# Patient Record
Sex: FEMALE | Race: WHITE | Employment: OTHER | URBAN - METROPOLITAN AREA
[De-identification: names, ages, dates, MRNs, and addresses within clinical notes are randomized per-mention and may not be internally consistent; named-entity substitution may affect disease eponyms.]

---

## 2017-03-09 ENCOUNTER — HOSPITAL ENCOUNTER (OUTPATIENT)
Dept: RADIOLOGY | Facility: MEDICAL CENTER | Age: 72
End: 2017-03-09
Attending: INTERNAL MEDICINE
Payer: COMMERCIAL

## 2017-03-09 DIAGNOSIS — Z12.31 SCREENING MAMMOGRAM, ENCOUNTER FOR: ICD-10-CM

## 2017-03-09 PROCEDURE — 77063 BREAST TOMOSYNTHESIS BI: CPT

## 2017-03-11 ENCOUNTER — TELEPHONE (OUTPATIENT)
Dept: MEDICAL GROUP | Facility: PHYSICIAN GROUP | Age: 72
End: 2017-03-11

## 2017-03-13 ENCOUNTER — TELEPHONE (OUTPATIENT)
Dept: RADIOLOGY | Facility: MEDICAL CENTER | Age: 72
End: 2017-03-13

## 2017-03-22 ENCOUNTER — OFFICE VISIT (OUTPATIENT)
Dept: MEDICAL GROUP | Facility: PHYSICIAN GROUP | Age: 72
End: 2017-03-22
Payer: COMMERCIAL

## 2017-03-22 VITALS
WEIGHT: 236 LBS | TEMPERATURE: 97.6 F | BODY MASS INDEX: 40.29 KG/M2 | RESPIRATION RATE: 14 BRPM | OXYGEN SATURATION: 95 % | HEART RATE: 76 BPM | SYSTOLIC BLOOD PRESSURE: 122 MMHG | DIASTOLIC BLOOD PRESSURE: 82 MMHG | HEIGHT: 64 IN

## 2017-03-22 DIAGNOSIS — E78.2 MIXED HYPERLIPIDEMIA: ICD-10-CM

## 2017-03-22 DIAGNOSIS — R92.8 ABNORMAL MAMMOGRAM: ICD-10-CM

## 2017-03-22 DIAGNOSIS — E66.09 OBESITY DUE TO EXCESS CALORIES, UNSPECIFIED OBESITY SEVERITY: ICD-10-CM

## 2017-03-22 DIAGNOSIS — R73.01 IMPAIRED FASTING GLUCOSE: ICD-10-CM

## 2017-03-22 PROCEDURE — 99214 OFFICE O/P EST MOD 30 MIN: CPT | Performed by: NURSE PRACTITIONER

## 2017-03-22 NOTE — MR AVS SNAPSHOT
"        Rosa Riley   3/22/2017 2:40 PM   Office Visit   MRN: [id number]    Department:  [facility]   Dept Phone:  [phone]    Description:  Female : 1945   Provider:  RUDY Shelley           Reason for Visit     Annual Exam new to you / no complaints / request labs      Allergies as of 3/22/2017     No Known Allergies      You were diagnosed with     Obesity due to excess calories, unspecified obesity severity   [1953199]       Mixed hyperlipidemia   [272.2.ICD-9-CM]       Abnormal mammogram   [071580]       Impaired fasting glucose   [790.21.ICD-9-CM]         Vital Signs     Blood Pressure Pulse Temperature Respirations Height Weight    122/82 mmHg 76 36.4 °C (97.6 °F) 14 1.615 m (5' 3.58\") 107.049 kg (236 lb)    Body Mass Index Oxygen Saturation Smoking Status             41.04 kg/m2 95% Former Smoker         Basic Information     Date Of Birth Sex Race Ethnicity Preferred Language    1945 Female White Non- English      Your appointments     2017 10:40 AM   MA DX30 with [facility] MG 1   [facility] ([address])    [address] [place] [address] [address]  [facility] [id number] [phone]              Problem List              ICD-10-CM Priority Class Noted - Resolved    Obesity E66.9   2015 - Present    Mixed hyperlipidemia E78.2   2015 - Present    Impaired fasting glucose R73.01   2015 - Present    Left knee injury S89.92XA   3/7/2016 - Present    Abnormal mammogram R92.8   3/22/2017 - Present      Health Maintenance        Date Due Completion Dates    IMM DTaP/Tdap/Td Vaccine (1 - Tdap) 3/18/1966 ---    PAP SMEAR 3/18/1968 ---    IMM ZOSTER VACCINE 3/18/2007 ---    IMM PNEUMOCOCCAL 65+ (ADULT) LOW/MEDIUM RISK SERIES (1 of 2 - PCV13) 3/18/2012 ---    COLONOSCOPY 2016 (Prv Comp)    Override on 2006: Previously completed    IMM INFLUENZA (1) 2016 ---    MAMMOGRAM 3/9/2018 3/9/2017, 2015, 2014, 2013, " 6/20/2012, 4/25/2011, 4/8/2010, 4/8/2010, 3/23/2009, 3/23/2009, 3/17/2008, 3/17/2008, 2/10/2007            Current Immunizations     No immunizations on file.      Below and/or attached are the medications your provider expects you to take. Review all of your home medications and newly ordered medications with your provider and/or pharmacist. Follow medication instructions as directed by your provider and/or pharmacist. Please keep your medication list with you and share with your provider. Update the information when medications are discontinued, doses are changed, or new medications (including over-the-counter products) are added; and carry medication information at all times in the event of emergency situations     Allergies:  No Known Allergies          Medications  Valid as of: March 22, 2017 -  5:03 PM    Generic Name Brand Name Tablet Size Instructions for use    .                 Medicines prescribed today were sent to:     [facility] [id number] [facility] [address] [address]    Phone: [phone] Fax: [phone]    Open 24 Hours?: No      Medication refill instructions:       If your prescription bottle indicates you have medication refills left, it is not necessary to call your provider’s office. Please contact your pharmacy and they will refill your medication.    If your prescription bottle indicates you do not have any refills left, you may request refills at any time through one of the following ways: The online [employer] system (except Urgent Care), by calling your provider’s office, or by asking your pharmacy to contact your provider’s office with a refill request. Medication refills are processed only during regular business hours and may not be available until the next business day. Your provider may request additional information or to have a follow-up visit with you prior to refilling your medication.   *Please Note: Medication refills are  assigned a new Rx number when refilled electronically. Your pharmacy may indicate that no refills were authorized even though a new prescription for the same medication is available at the pharmacy. Please request the medicine by name with the pharmacy before contacting your provider for a refill.        Your To Do List     Future Labs/Procedures Complete By Expires    COMP METABOLIC PANEL  As directed 3/22/2018    VITAMIN D,25 HYDROXY  As directed 3/22/2018         MyChart Access Code: Activation code not generated  Current QuesCom Status: Active

## 2017-03-22 NOTE — ASSESSMENT & PLAN NOTE
Patient has elevated lipids in the past.  Patient does monitor her cholesterol once a year. Multivitamin. Vitamin E. Calcium TUMS. No fish oil.

## 2017-03-22 NOTE — ASSESSMENT & PLAN NOTE
This is a 72 year old female who has elevated BMI.  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  Diet: Standard Diet - Maintains - tries to stick with WW.

## 2017-03-23 NOTE — PROGRESS NOTES
Chief Complaint   Patient presents with   • Annual Exam     new to you / no complaints / request labs         HISTORY OF THE PRESENT ILLNESS: Patient is a 72 y.o. female. This pleasant patient is here today establish care. Her PCP retired and needs to establish.    Patient does not obtain preventative vaccines which include influenza and pneumonia. History of normal colonoscopy and is up-to-date. No longer obtaining Pap smears. Does get screening mammogram and labs.        Obesity  This is a 72 year old female who has elevated BMI.  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  Diet: Standard Diet - Maintains - tries to stick with WW.     Mixed hyperlipidemia  Patient has elevated lipids in the past.  Patient does monitor her cholesterol once a year. Multivitamin. Vitamin E. Calcium TUMS. No fish oil.     Impaired fasting glucose  Patient has history of elevated glucose.       Allergies: Review of patient's allergies indicates no known allergies.    No current [facility]-ordered outpatient prescriptions on file.     No current [facility]-ordered facility-administered medications on file.       Past Medical History   Diagnosis Date   • Hyperlipidemia        Past Surgical History   Procedure Laterality Date   • Tonsillectomy     • Foot orif     • Dilation and curettage         Social History   Substance Use Topics   • Smoking status: Former Smoker     Quit date: 01/22/1995   • Smokeless tobacco: Never Used   • Alcohol Use: 0.0 oz/week     0 Standard drinks or equivalent per week      Comment: social       No family status information on file.   History reviewed. No pertinent family history.    Review of Systems   Constitutional: Negative for fever, chills, weight loss and malaise/fatigue.   HENT: Negative for ear pain, nosebleeds, congestion, sore throat and neck pain.    Eyes: Negative for blurred vision.   Respiratory: Negative for cough, sputum production, shortness of breath and wheezing.    Cardiovascular:  "Negative for chest pain, palpitations, orthopnea and leg swelling.   Gastrointestinal: Negative for heartburn, nausea, vomiting and abdominal pain.   Genitourinary: Negative for dysuria, urgency and frequency.   Musculoskeletal: Negative for myalgias, back pain and joint pain.   Skin: Negative for rash and itching.   Neurological: Negative for dizziness, tingling, tremors, sensory change, focal weakness and headaches.   Endo/Heme/Allergies: Does not bruise/bleed easily.   Psychiatric/Behavioral: Negative for depression, anxiety, or memory loss.           Exam: Blood pressure 122/82, pulse 76, temperature 36.4 °C (97.6 °F), resp. rate 14, height 1.615 m (5' 3.58\"), weight 107.049 kg (236 lb), SpO2 95 %.  GENERALl: WDWN Alert, no distress  SKIN:  Warm, dry, good turgor. No rashes are present and visible area.  EYE: Equal round and reactive, conjunctiva clear lids normal.  ENMT: Lips without lesions good dentition oropharynx clear.  NECK: Trachea midline, no masses. No thyromegaly  RESPIRATORY: Unlabored respiratory efforts lungs clear to auscultation no wheezes no rhonchi.  CARDIOVASCULAR: Normal S1-S2 no murmur no edema  ABDOMEN: Soft, nontender no masses no hepatomegaly.  PSYCH: Alert and oriented x3 normal affect and mood   Musculoskeletal: No swollen joints, upper and lower extremities strength and tone normal, reflexes at biceps and patella 2+, gait is unlabored    Please note that this dictation was created using voice recognition software. I have made every reasonable attempt to correct obvious errors, but I expect that there are errors of grammar and possibly content that I did not discover before finalizing the note.      Assessment/Plan  1. Obesity due to excess calories, unspecified obesity severity  COMP METABOLIC PANEL    LIPID PANEL    VITAMIN D,25 HYDROXY   2. Mixed hyperlipidemia - Current status of condition is chronic and controlled on therapy.  And will monitor COMP METABOLIC PANEL    LIPID PANEL    " VITAMIN D,25 HYDROXY   3. Abnormal mammogram - has follow up COMP METABOLIC PANEL    LIPID PANEL    VITAMIN D,25 HYDROXY   4. Impaired fasting glucose - will monitor COMP METABOLIC PANEL    LIPID PANEL    VITAMIN D,25 HYDROXY

## 2017-04-03 ENCOUNTER — HOSPITAL ENCOUNTER (OUTPATIENT)
Dept: RADIOLOGY | Facility: MEDICAL CENTER | Age: 72
End: 2017-04-03
Attending: NURSE PRACTITIONER
Payer: COMMERCIAL

## 2017-04-03 DIAGNOSIS — R92.8 ABNORMAL MAMMOGRAM OF LEFT BREAST: ICD-10-CM

## 2017-04-03 PROCEDURE — G0279 TOMOSYNTHESIS, MAMMO: HCPCS | Mod: LT

## 2017-04-03 PROCEDURE — 76642 ULTRASOUND BREAST LIMITED: CPT | Mod: LT

## 2017-10-13 ENCOUNTER — HOSPITAL ENCOUNTER (OUTPATIENT)
Dept: LAB | Facility: MEDICAL CENTER | Age: 72
End: 2017-10-13
Attending: NURSE PRACTITIONER
Payer: MEDICARE

## 2017-10-13 DIAGNOSIS — R92.8 ABNORMAL MAMMOGRAM: ICD-10-CM

## 2017-10-13 DIAGNOSIS — E78.2 MIXED HYPERLIPIDEMIA: ICD-10-CM

## 2017-10-13 DIAGNOSIS — R73.01 IMPAIRED FASTING GLUCOSE: ICD-10-CM

## 2017-10-13 DIAGNOSIS — E66.09 OBESITY DUE TO EXCESS CALORIES, UNSPECIFIED OBESITY SEVERITY: ICD-10-CM

## 2017-10-13 LAB
25(OH)D3 SERPL-MCNC: 41 NG/ML (ref 30–100)
ALBUMIN SERPL BCP-MCNC: 4.2 G/DL (ref 3.2–4.9)
ALBUMIN/GLOB SERPL: 1.6 G/DL
ALP SERPL-CCNC: 74 U/L (ref 30–99)
ALT SERPL-CCNC: 14 U/L (ref 2–50)
ANION GAP SERPL CALC-SCNC: 7 MMOL/L (ref 0–11.9)
AST SERPL-CCNC: 19 U/L (ref 12–45)
BILIRUB SERPL-MCNC: 0.9 MG/DL (ref 0.1–1.5)
BUN SERPL-MCNC: 15 MG/DL (ref 8–22)
CALCIUM SERPL-MCNC: 9.3 MG/DL (ref 8.5–10.5)
CHLORIDE SERPL-SCNC: 105 MMOL/L (ref 96–112)
CHOLEST SERPL-MCNC: 251 MG/DL (ref 100–199)
CO2 SERPL-SCNC: 26 MMOL/L (ref 20–33)
CREAT SERPL-MCNC: 0.68 MG/DL (ref 0.5–1.4)
GFR SERPL CREATININE-BSD FRML MDRD: >60 ML/MIN/1.73 M 2
GLOBULIN SER CALC-MCNC: 2.7 G/DL (ref 1.9–3.5)
GLUCOSE SERPL-MCNC: 109 MG/DL (ref 65–99)
HDLC SERPL-MCNC: 69 MG/DL
LDLC SERPL CALC-MCNC: 157 MG/DL
POTASSIUM SERPL-SCNC: 4.4 MMOL/L (ref 3.6–5.5)
PROT SERPL-MCNC: 6.9 G/DL (ref 6–8.2)
SODIUM SERPL-SCNC: 138 MMOL/L (ref 135–145)
TRIGL SERPL-MCNC: 124 MG/DL (ref 0–149)

## 2017-10-13 PROCEDURE — 36415 COLL VENOUS BLD VENIPUNCTURE: CPT

## 2017-10-13 PROCEDURE — 82306 VITAMIN D 25 HYDROXY: CPT | Mod: GA

## 2017-10-13 PROCEDURE — 80061 LIPID PANEL: CPT

## 2017-10-13 PROCEDURE — 80053 COMPREHEN METABOLIC PANEL: CPT

## 2017-10-20 ENCOUNTER — OFFICE VISIT (OUTPATIENT)
Dept: MEDICAL GROUP | Facility: PHYSICIAN GROUP | Age: 72
End: 2017-10-20
Payer: MEDICARE

## 2017-10-20 VITALS
HEIGHT: 64 IN | OXYGEN SATURATION: 97 % | RESPIRATION RATE: 16 BRPM | BODY MASS INDEX: 40.29 KG/M2 | TEMPERATURE: 97.8 F | WEIGHT: 236 LBS | SYSTOLIC BLOOD PRESSURE: 132 MMHG | HEART RATE: 72 BPM | DIASTOLIC BLOOD PRESSURE: 74 MMHG

## 2017-10-20 DIAGNOSIS — E78.2 MIXED HYPERLIPIDEMIA: ICD-10-CM

## 2017-10-20 DIAGNOSIS — R73.01 IMPAIRED FASTING GLUCOSE: ICD-10-CM

## 2017-10-20 DIAGNOSIS — R92.8 ABNORMAL MAMMOGRAM: ICD-10-CM

## 2017-10-20 PROCEDURE — 99214 OFFICE O/P EST MOD 30 MIN: CPT | Performed by: NURSE PRACTITIONER

## 2017-10-20 ASSESSMENT — PATIENT HEALTH QUESTIONNAIRE - PHQ9: CLINICAL INTERPRETATION OF PHQ2 SCORE: 0

## 2017-10-20 NOTE — ASSESSMENT & PLAN NOTE
April 2017 - on yearly check.   Patient has ultrasound. ON YEARLY CHECK>    IMPRESSION:     No mammographic evidence of malignancy. Recommend annual follow-up mammography, monthly self breast exam and routine clinical evaluation.     These results were given to the patient at the time of visit.

## 2017-10-20 NOTE — ASSESSMENT & PLAN NOTE
Results for KATIE ZHANG (MRN [id number]) as of 10/19/2017 20:03   Ref. Range 10/13/2017 08:28   Cholesterol,Tot Latest Ref Range: 100 - 199 mg/dL 251 (H)   Triglycerides Latest Ref Range: 0 - 149 mg/dL 124   HDL Latest Ref Range: >=40 mg/dL 69   LDL Latest Ref Range: <100 mg/dL 157 (H)

## 2017-10-20 NOTE — PROGRESS NOTES
"Chief Complaint   Patient presents with   • Hyperlipidemia     fv labs       HISTORY OF PRESENT ILLNESS: Patient is a @ age 72 here  today to discuss:     Interval history:     Hospitalizations NONE    Injuries NONE     Illness  NONE         Mixed hyperlipidemia  Results for KATIE ZHANG (MRN [id number]) as of 10/19/2017 20:03   Ref. Range 10/13/2017 08:28   Cholesterol,Tot Latest Ref Range: 100 - 199 mg/dL 251 (H)   Triglycerides Latest Ref Range: 0 - 149 mg/dL 124   HDL Latest Ref Range: >=40 mg/dL 69   LDL Latest Ref Range: <100 mg/dL 157 (H)       Abnormal mammogram   April 2017 - on yearly check.   Patient has ultrasound. ON YEARLY CHECK>    IMPRESSION:     No mammographic evidence of malignancy. Recommend annual follow-up mammography, monthly self breast exam and routine clinical evaluation.     These results were given to the patient at the time of visit.       Impaired fasting glucose  Patient exercises 3-5 times a week.        Ref. Range 10/13/2017 08:28   Glucose Latest Ref Range: 65 - 99 mg/dL 109 (H)         Allergies:Review of patient's allergies indicates no known allergies.    No current [facility]-ordered outpatient prescriptions on file.     No current [facility]-ordered facility-administered medications on file.        Past Medical History:   Diagnosis Date   • Hyperlipidemia        Social History   Substance Use Topics   • Smoking status: Former Smoker     Quit date: 1/22/1995   • Smokeless tobacco: Never Used   • Alcohol use 0.0 oz/week      Comment: social       No family status information on file.   History reviewed. No pertinent family history.    ROS: As documented in my HPI      Exam:  Blood pressure 132/74, pulse 72, temperature 36.6 °C (97.8 °F), resp. rate 16, height 1.626 m (5' 4\"), weight 107 kg (236 lb), SpO2 97 %.  General:  Well nourished, well developed female in NAD  Head: Nontender scalp. No lesions  Neck: Supple. Symmetric Thyroid palpated. No bruits  Pulmonary:  Normal effort. No rales, " ronchi, or wheezing.  Cardiovascular: Regular rate and rhythm without murmur.   Abdomen: Soft nontender, normal bowel sounds  Extremities: no clubbing, cyanosis, or edema.  Psych: Alert and oriented x3.  Neurological: No focal deficits    Please note that this dictation was created using voice recognition software. I have made every reasonable attempt to correct obvious errors, but I expect that there are errors of grammar and possibly content that I did not discover before finalizing the note.    Assessment/Plan:  1. Mixed hyperlipidemia   Improving -with diet.    2. Abnormal mammogram   Yearly check.    3. Impaired fasting glucose    Improving with diet.           Does not get flu shot.

## 2017-10-20 NOTE — ASSESSMENT & PLAN NOTE
Patient exercises 3-5 times a week.        Ref. Range 10/13/2017 08:28   Glucose Latest Ref Range: 65 - 99 mg/dL 109 (H)

## 2018-06-21 ENCOUNTER — HOSPITAL ENCOUNTER (OUTPATIENT)
Dept: RADIOLOGY | Facility: MEDICAL CENTER | Age: 73
End: 2018-06-21
Attending: NURSE PRACTITIONER
Payer: MEDICARE

## 2018-06-21 DIAGNOSIS — Z12.31 SCREENING MAMMOGRAM, ENCOUNTER FOR: ICD-10-CM

## 2018-06-21 PROCEDURE — 77067 SCR MAMMO BI INCL CAD: CPT

## 2018-08-31 ENCOUNTER — TELEPHONE (OUTPATIENT)
Dept: MEDICAL GROUP | Facility: PHYSICIAN GROUP | Age: 73
End: 2018-08-31

## 2018-08-31 NOTE — TELEPHONE ENCOUNTER
FYI: Patient scheduled to see Dr. Jaquez on 8/30/18 [employer] message sent regarding labs forwarded to PCP. Patient states that if PCP orders labs she is requesting that PCP not order a Vitamin D lab as Insurance will not pay for it.

## 2018-09-04 DIAGNOSIS — R73.01 ELEVATED FASTING GLUCOSE: ICD-10-CM

## 2018-09-04 DIAGNOSIS — R79.89 ELEVATED TSH: ICD-10-CM

## 2018-09-04 DIAGNOSIS — E78.5 DYSLIPIDEMIA: ICD-10-CM

## 2018-09-04 DIAGNOSIS — R73.01 IMPAIRED FASTING GLUCOSE: ICD-10-CM

## 2018-10-24 ENCOUNTER — HOSPITAL ENCOUNTER (OUTPATIENT)
Dept: LAB | Facility: MEDICAL CENTER | Age: 73
End: 2018-10-24
Attending: FAMILY MEDICINE
Payer: MEDICARE

## 2018-10-24 DIAGNOSIS — R73.01 IMPAIRED FASTING GLUCOSE: ICD-10-CM

## 2018-10-24 DIAGNOSIS — R79.89 ELEVATED TSH: ICD-10-CM

## 2018-10-24 DIAGNOSIS — E78.5 DYSLIPIDEMIA: ICD-10-CM

## 2018-10-24 DIAGNOSIS — R73.01 ELEVATED FASTING GLUCOSE: ICD-10-CM

## 2018-10-24 LAB
ALBUMIN SERPL BCP-MCNC: 4.1 G/DL (ref 3.2–4.9)
ALBUMIN/GLOB SERPL: 1.5 G/DL
ALP SERPL-CCNC: 74 U/L (ref 30–99)
ALT SERPL-CCNC: 17 U/L (ref 2–50)
ANION GAP SERPL CALC-SCNC: 8 MMOL/L (ref 0–11.9)
AST SERPL-CCNC: 21 U/L (ref 12–45)
BILIRUB SERPL-MCNC: 0.9 MG/DL (ref 0.1–1.5)
BUN SERPL-MCNC: 11 MG/DL (ref 8–22)
CALCIUM SERPL-MCNC: 9.8 MG/DL (ref 8.5–10.5)
CHLORIDE SERPL-SCNC: 106 MMOL/L (ref 96–112)
CHOLEST SERPL-MCNC: 260 MG/DL (ref 100–199)
CO2 SERPL-SCNC: 28 MMOL/L (ref 20–33)
CREAT SERPL-MCNC: 0.76 MG/DL (ref 0.5–1.4)
EST. AVERAGE GLUCOSE BLD GHB EST-MCNC: 126 MG/DL
FASTING STATUS PATIENT QL REPORTED: NORMAL
GLOBULIN SER CALC-MCNC: 2.8 G/DL (ref 1.9–3.5)
GLUCOSE SERPL-MCNC: 104 MG/DL (ref 65–99)
HBA1C MFR BLD: 6 % (ref 0–5.6)
HDLC SERPL-MCNC: 70 MG/DL
LDLC SERPL CALC-MCNC: 170 MG/DL
POTASSIUM SERPL-SCNC: 4.1 MMOL/L (ref 3.6–5.5)
PROT SERPL-MCNC: 6.9 G/DL (ref 6–8.2)
SODIUM SERPL-SCNC: 142 MMOL/L (ref 135–145)
T4 FREE SERPL-MCNC: 0.84 NG/DL (ref 0.53–1.43)
TRIGL SERPL-MCNC: 99 MG/DL (ref 0–149)
TSH SERPL DL<=0.005 MIU/L-ACNC: 6.64 UIU/ML (ref 0.38–5.33)

## 2018-10-24 PROCEDURE — 36415 COLL VENOUS BLD VENIPUNCTURE: CPT | Mod: GA

## 2018-10-24 PROCEDURE — 84443 ASSAY THYROID STIM HORMONE: CPT

## 2018-10-24 PROCEDURE — 83036 HEMOGLOBIN GLYCOSYLATED A1C: CPT | Mod: GA

## 2018-10-24 PROCEDURE — 80053 COMPREHEN METABOLIC PANEL: CPT

## 2018-10-24 PROCEDURE — 80061 LIPID PANEL: CPT

## 2018-10-24 PROCEDURE — 84439 ASSAY OF FREE THYROXINE: CPT

## 2018-10-25 NOTE — PROGRESS NOTES
Rosa,  Your labs show high blood sugar and cholesterol: you can improve this with diet changes: more vegetables, less starches and sweets and fatty foods.   All other labs are normal!  Quintin Jaquez M.D.

## 2018-10-31 ENCOUNTER — OFFICE VISIT (OUTPATIENT)
Dept: MEDICAL GROUP | Facility: PHYSICIAN GROUP | Age: 73
End: 2018-10-31
Payer: MEDICARE

## 2018-10-31 VITALS
HEART RATE: 73 BPM | HEIGHT: 63 IN | BODY MASS INDEX: 36.96 KG/M2 | OXYGEN SATURATION: 98 % | TEMPERATURE: 98.3 F | RESPIRATION RATE: 16 BRPM | DIASTOLIC BLOOD PRESSURE: 74 MMHG | SYSTOLIC BLOOD PRESSURE: 128 MMHG | WEIGHT: 208.6 LBS

## 2018-10-31 DIAGNOSIS — Z12.11 COLON CANCER SCREENING: ICD-10-CM

## 2018-10-31 DIAGNOSIS — E66.9 OBESITY (BMI 35.0-39.9 WITHOUT COMORBIDITY): ICD-10-CM

## 2018-10-31 DIAGNOSIS — E78.2 MIXED HYPERLIPIDEMIA: ICD-10-CM

## 2018-10-31 DIAGNOSIS — E66.09 CLASS 2 OBESITY DUE TO EXCESS CALORIES WITHOUT SERIOUS COMORBIDITY WITH BODY MASS INDEX (BMI) OF 37.0 TO 37.9 IN ADULT: ICD-10-CM

## 2018-10-31 DIAGNOSIS — R73.01 IMPAIRED FASTING GLUCOSE: ICD-10-CM

## 2018-10-31 PROBLEM — R92.8 ABNORMAL MAMMOGRAM: Status: RESOLVED | Noted: 2017-03-22 | Resolved: 2018-10-31

## 2018-10-31 PROCEDURE — G0439 PPPS, SUBSEQ VISIT: HCPCS | Performed by: FAMILY MEDICINE

## 2018-10-31 ASSESSMENT — ACTIVITIES OF DAILY LIVING (ADL): BATHING_REQUIRES_ASSISTANCE: 0

## 2018-10-31 ASSESSMENT — ENCOUNTER SYMPTOMS: GENERAL WELL-BEING: EXCELLENT

## 2018-10-31 ASSESSMENT — PATIENT HEALTH QUESTIONNAIRE - PHQ9: CLINICAL INTERPRETATION OF PHQ2 SCORE: 0

## 2018-10-31 NOTE — PROGRESS NOTES
Chief Complaint   Patient presents with   • Annual Wellness Visit         HPI:  Rosa is a 73 y.o. here for Medicare Annual Wellness Visit    Patient declines all immunizations. Encouraged her to think about getting immunizations.     Patient Active Problem List    Diagnosis Date Noted   • Abnormal mammogram 03/22/2017   • Left knee injury 03/07/2016   • Obesity 04/06/2015   • Mixed hyperlipidemia 04/06/2015   • Impaired fasting glucose 04/06/2015       No current outpatient prescriptions on file.     No current facility-administered medications for this visit.         Patient is taking medications as noted in medication list.  Current supplements as per medication list.     Allergies: Patient has no known allergies.    Current social contact/activities: yard / movies / family / friends     Is patient current with immunizations? No, due for FLU, PNEUMOVAX (PPSV23), TDAP and SHINGRIX (Shingles). Patient is interested in receiving NONE today.    She  reports that she quit smoking about 23 years ago. She has never used smokeless tobacco. She reports that she drinks alcohol. She reports that she does not use drugs.  Counseling given: Not Answered        DPA/Advanced directive: Patient has Advanced Directive, Living Will and Durable Power of , but it is not on file. Instructed to bring in a copy to scan into their chart.    ROS:    Gait: Uses no assistive device   Ostomy: No   Other tubes: No   Amputations: No   Chronic oxygen use No   Last eye exam 2017   Wears hearing aids: No   : Denies any urinary leakage during the last 6 months      Screening:        Depression Screening    Little interest or pleasure in doing things?  0 - not at all  Feeling down, depressed, or hopeless? 0 - not at all  Patient Health Questionnaire Score: 0    If depressive symptoms identified deferred to follow up visit unless specifically addressed in assessment and plan.    Interpretation of PHQ-9 Total Score   Score Severity   1-4  No Depression   5-9 Mild Depression   10-14 Moderate Depression   15-19 Moderately Severe Depression   20-27 Severe Depression    Screening for Cognitive Impairment    Three Minute Recall (leader, season, table)  3/3    John clock face with all 12 numbers and set the hands to show 10 past 11.  Yes    If cognitive concerns identified, deferred for follow up unless specifically addressed in assessment and plan.    Fall Risk Assessment    Has the patient had two or more falls in the last year or any fall with injury in the last year?  No  If fall risk identified, deferred for follow up unless specifically addressed in assessment and plan.    Safety Assessment    Throw rugs on floor.  Yes  Handrails on all stairs.  No  Good lighting in all hallways.  Yes  Difficulty hearing.  No  Patient counseled about all safety risks that were identified.    Functional Assessment ADLs    Are there any barriers preventing you from cooking for yourself or meeting nutritional needs?  No.    Are there any barriers preventing you from driving safely or obtaining transportation?  No.    Are there any barriers preventing you from using a telephone or calling for help?  No.    Are there any barriers preventing you from shopping?  No.    Are there any barriers preventing you from taking care of your own finances?  No.    Are there any barriers preventing you from managing your medications?  No.    Are there any barriers preventing you from showering, bathing or dressing yourself?  No.    Are you currently engaging in any exercise or physical activity?  Yes.     What is your perception of your health?  Excellent.    Health Maintenance Summary                Annual Wellness Visit Overdue 1945     IMM DTaP/Tdap/Td Vaccine Overdue 3/18/1966     IMM ZOSTER VACCINES Overdue 3/18/1997     IMM PNEUMOCOCCAL 65+ (ADULT) LOW/MEDIUM RISK SERIES Overdue 3/18/2012     COLONOSCOPY Overdue 4/6/2016      Previously completed 4/6/2006     IMM INFLUENZA  Overdue 9/1/2018     MAMMOGRAM Next Due 6/21/2019      Done 6/21/2018 MA-MAMMO SCREENING BILAT W/TOMOSYNTHESIS W/CAD     Patient has more history with this topic...          Patient Care Team:  RUDY Shelley as PCP - General (Family Medicine)    Social History   Substance Use Topics   • Smoking status: Former Smoker     Quit date: 1/22/1995   • Smokeless tobacco: Never Used   • Alcohol use 0.0 oz/week      Comment: social     No family history on file.  She  has a past medical history of Hyperlipidemia.   Past Surgical History:   Procedure Laterality Date   • DILATION AND CURETTAGE     • FOOT ORIF     • TONSILLECTOMY             Exam:     There were no vitals taken for this visit. There is no height or weight on file to calculate BMI.    Hearing excellent.    Dentition good  Alert, oriented in no acute distress.  Eye contact is good, speech goal directed, affect calm      Assessment and Plan. The following treatment and monitoring plan is recommended:    Obesity  Very pleased to see patient has lost 30 lbs since last week.   She does follow weight watchers.   I encouraged her to continue with gradual sustainable weight loss.     Mixed hyperlipidemia  Slight increase in cholesterol since last visit.   She notes this is surprising as she follows a healthy diet.   HDL is protective.   She prefers not to start a statin.   Will continue to monitor labs and if very elevated will discuss statin again.     Impaired fasting glucose  Controlled with exercise and diet.   A1c recently is at 6.0  Will continue to monitor.       Services suggested: No services needed at this time  Health Care Screening recommendations as per orders if indicated.  Referrals offered: PT/OT/Nutrition counseling/Behavioral Health/Smoking cessation as per orders if indicated.    Discussion today about general wellness and lifestyle habits:    · Prevent falls and reduce trip hazards; Cautioned about securing or removing rugs.  · Have a working  fire alarm and carbon monoxide detector;   · Engage in regular physical activity and social activities       Follow-up: No Follow-up on file.

## 2018-11-13 ENCOUNTER — HOSPITAL ENCOUNTER (OUTPATIENT)
Facility: MEDICAL CENTER | Age: 73
End: 2018-11-13
Attending: FAMILY MEDICINE
Payer: MEDICARE

## 2018-11-13 PROCEDURE — 82274 ASSAY TEST FOR BLOOD FECAL: CPT

## 2018-11-15 DIAGNOSIS — Z12.11 COLON CANCER SCREENING: ICD-10-CM

## 2018-11-15 LAB — AMBIGUOUS DTTM AMBI4: NORMAL

## 2018-11-16 LAB — HEMOCCULT STL QL IA: NEGATIVE

## 2018-12-10 NOTE — ASSESSMENT & PLAN NOTE
Controlled with exercise and diet.   A1c recently is at 6.0  Will continue to monitor.   
Slight increase in cholesterol since last visit.   She notes this is surprising as she follows a healthy diet.   HDL is protective.   She prefers not to start a statin.   Will continue to monitor labs and if very elevated will discuss statin again.   
Very pleased to see patient has lost 30 lbs since last week.   She does follow weight watchers.   I encouraged her to continue with gradual sustainable weight loss.   
Home

## 2019-08-20 ENCOUNTER — HOSPITAL ENCOUNTER (OUTPATIENT)
Dept: RADIOLOGY | Facility: MEDICAL CENTER | Age: 74
End: 2019-08-20
Attending: FAMILY MEDICINE
Payer: MEDICARE

## 2019-08-20 DIAGNOSIS — Z12.31 VISIT FOR SCREENING MAMMOGRAM: ICD-10-CM

## 2019-08-20 PROCEDURE — 77063 BREAST TOMOSYNTHESIS BI: CPT

## 2019-08-22 NOTE — RESULT ENCOUNTER NOTE
Rosa  Your mammogram was normal! Next is due in one year.   Please let me know immediately if you notice any new lumps/rashes/pain/nipple discharge.  Quintin Jaquez M.D.

## 2019-10-24 DIAGNOSIS — E78.2 MIXED HYPERLIPIDEMIA: ICD-10-CM

## 2019-10-24 DIAGNOSIS — R79.89 ELEVATED TSH: ICD-10-CM

## 2019-10-24 DIAGNOSIS — R73.01 ELEVATED FASTING GLUCOSE: ICD-10-CM

## 2020-01-03 DIAGNOSIS — R35.0 URINARY FREQUENCY: ICD-10-CM

## 2020-01-06 ENCOUNTER — HOSPITAL ENCOUNTER (OUTPATIENT)
Dept: LAB | Facility: MEDICAL CENTER | Age: 75
End: 2020-01-06
Attending: FAMILY MEDICINE
Payer: MEDICARE

## 2020-01-06 DIAGNOSIS — R73.01 ELEVATED FASTING GLUCOSE: ICD-10-CM

## 2020-01-06 DIAGNOSIS — R79.89 ELEVATED TSH: ICD-10-CM

## 2020-01-06 DIAGNOSIS — E78.2 MIXED HYPERLIPIDEMIA: ICD-10-CM

## 2020-01-06 LAB
ALBUMIN SERPL BCP-MCNC: 4.2 G/DL (ref 3.2–4.9)
ALBUMIN/GLOB SERPL: 1.4 G/DL
ALP SERPL-CCNC: 74 U/L (ref 30–99)
ALT SERPL-CCNC: 21 U/L (ref 2–50)
ANION GAP SERPL CALC-SCNC: 9 MMOL/L (ref 0–11.9)
APPEARANCE UR: ABNORMAL
AST SERPL-CCNC: 22 U/L (ref 12–45)
BACTERIA #/AREA URNS HPF: ABNORMAL /HPF
BILIRUB SERPL-MCNC: 0.9 MG/DL (ref 0.1–1.5)
BILIRUB UR QL STRIP.AUTO: NEGATIVE
BUN SERPL-MCNC: 12 MG/DL (ref 8–22)
CALCIUM SERPL-MCNC: 9.2 MG/DL (ref 8.5–10.5)
CHLORIDE SERPL-SCNC: 106 MMOL/L (ref 96–112)
CHOLEST SERPL-MCNC: 282 MG/DL (ref 100–199)
CO2 SERPL-SCNC: 27 MMOL/L (ref 20–33)
COLOR UR: YELLOW
CREAT SERPL-MCNC: 0.78 MG/DL (ref 0.5–1.4)
EPI CELLS #/AREA URNS HPF: ABNORMAL /HPF
EST. AVERAGE GLUCOSE BLD GHB EST-MCNC: 111 MG/DL
FASTING STATUS PATIENT QL REPORTED: NORMAL
GLOBULIN SER CALC-MCNC: 2.9 G/DL (ref 1.9–3.5)
GLUCOSE SERPL-MCNC: 102 MG/DL (ref 65–99)
GLUCOSE UR STRIP.AUTO-MCNC: NEGATIVE MG/DL
HBA1C MFR BLD: 5.5 % (ref 0–5.6)
HDLC SERPL-MCNC: 88 MG/DL
KETONES UR STRIP.AUTO-MCNC: NEGATIVE MG/DL
LDLC SERPL CALC-MCNC: 176 MG/DL
LEUKOCYTE ESTERASE UR QL STRIP.AUTO: ABNORMAL
MICRO URNS: ABNORMAL
MUCOUS THREADS #/AREA URNS HPF: ABNORMAL /HPF
NITRITE UR QL STRIP.AUTO: NEGATIVE
PH UR STRIP.AUTO: 5.5 [PH] (ref 5–8)
POTASSIUM SERPL-SCNC: 4.3 MMOL/L (ref 3.6–5.5)
PROT SERPL-MCNC: 7.1 G/DL (ref 6–8.2)
PROT UR QL STRIP: NEGATIVE MG/DL
RBC # URNS HPF: ABNORMAL /HPF
RBC UR QL AUTO: ABNORMAL
SODIUM SERPL-SCNC: 142 MMOL/L (ref 135–145)
SP GR UR STRIP.AUTO: 1.02
TRIGL SERPL-MCNC: 92 MG/DL (ref 0–149)
UROBILINOGEN UR STRIP.AUTO-MCNC: 0.2 MG/DL
WBC #/AREA URNS HPF: ABNORMAL /HPF

## 2020-01-06 PROCEDURE — 81001 URINALYSIS AUTO W/SCOPE: CPT

## 2020-01-06 PROCEDURE — 83036 HEMOGLOBIN GLYCOSYLATED A1C: CPT | Mod: GA

## 2020-01-06 PROCEDURE — 80061 LIPID PANEL: CPT

## 2020-01-06 PROCEDURE — 84443 ASSAY THYROID STIM HORMONE: CPT

## 2020-01-06 PROCEDURE — 80053 COMPREHEN METABOLIC PANEL: CPT

## 2020-01-06 PROCEDURE — 36415 COLL VENOUS BLD VENIPUNCTURE: CPT

## 2020-01-07 LAB — TSH SERPL DL<=0.005 MIU/L-ACNC: 4.94 UIU/ML (ref 0.38–5.33)

## 2020-01-10 ENCOUNTER — OFFICE VISIT (OUTPATIENT)
Dept: MEDICAL GROUP | Facility: PHYSICIAN GROUP | Age: 75
End: 2020-01-10
Payer: MEDICARE

## 2020-01-10 ENCOUNTER — HOSPITAL ENCOUNTER (OUTPATIENT)
Facility: MEDICAL CENTER | Age: 75
End: 2020-01-10
Attending: FAMILY MEDICINE
Payer: MEDICARE

## 2020-01-10 VITALS
HEART RATE: 75 BPM | TEMPERATURE: 97.9 F | WEIGHT: 207 LBS | SYSTOLIC BLOOD PRESSURE: 142 MMHG | OXYGEN SATURATION: 97 % | RESPIRATION RATE: 14 BRPM | DIASTOLIC BLOOD PRESSURE: 60 MMHG | HEIGHT: 63 IN | BODY MASS INDEX: 36.68 KG/M2

## 2020-01-10 DIAGNOSIS — R31.9 HEMATURIA, UNSPECIFIED TYPE: ICD-10-CM

## 2020-01-10 DIAGNOSIS — R35.0 URINARY FREQUENCY: ICD-10-CM

## 2020-01-10 DIAGNOSIS — R73.01 ELEVATED FASTING GLUCOSE: ICD-10-CM

## 2020-01-10 DIAGNOSIS — E78.2 MIXED HYPERLIPIDEMIA: ICD-10-CM

## 2020-01-10 DIAGNOSIS — Z28.21 PNEUMOCOCCAL VACCINATION DECLINED: ICD-10-CM

## 2020-01-10 DIAGNOSIS — E66.9 OBESITY (BMI 35.0-39.9 WITHOUT COMORBIDITY): ICD-10-CM

## 2020-01-10 DIAGNOSIS — R73.01 IMPAIRED FASTING GLUCOSE: ICD-10-CM

## 2020-01-10 DIAGNOSIS — Z28.21 INFLUENZA VACCINATION DECLINED: ICD-10-CM

## 2020-01-10 PROCEDURE — 87086 URINE CULTURE/COLONY COUNT: CPT

## 2020-01-10 PROCEDURE — G0439 PPPS, SUBSEQ VISIT: HCPCS | Performed by: FAMILY MEDICINE

## 2020-01-10 RX ORDER — RETINOL, ERGOCALCIFEROL, .ALPHA.-TOCOPHEROL ACETATE, DL-, PHYTONADIONE, ASCORBIC ACID, NIACINAMIDE, RIBOFLAVIN 5-PHOSPHATE SODIUM, THIAMINE HYDROCHLORIDE, PYRIDOXINE HYDROCHLORIDE, DEXPANTHENOL, BIOTIN, FOLIC ACID, AND CYANOCOBALAMIN 200-150/10
KIT INTRAVENOUS
COMMUNITY
End: 2020-07-21

## 2020-01-10 RX ORDER — NITROFURANTOIN 25; 75 MG/1; MG/1
100 CAPSULE ORAL 2 TIMES DAILY
Qty: 10 CAP | Refills: 0 | Status: SHIPPED | OUTPATIENT
Start: 2020-01-10 | End: 2020-01-15

## 2020-01-10 ASSESSMENT — ENCOUNTER SYMPTOMS: GENERAL WELL-BEING: EXCELLENT

## 2020-01-10 ASSESSMENT — ACTIVITIES OF DAILY LIVING (ADL): BATHING_REQUIRES_ASSISTANCE: 0

## 2020-01-10 ASSESSMENT — PATIENT HEALTH QUESTIONNAIRE - PHQ9: CLINICAL INTERPRETATION OF PHQ2 SCORE: 0

## 2020-01-10 NOTE — ASSESSMENT & PLAN NOTE
A1c improved to 5.5  Encouraged to continue healthy diet changes low in sugars and starches, regular exercise and gradual weight loss.

## 2020-01-10 NOTE — PROGRESS NOTES
Chief Complaint   Patient presents with   • Annual Wellness Visit         HPI:  Rosa Riley is a 74 y.o. here for Medicare Annual Wellness Visit     Patient Active Problem List    Diagnosis Date Noted   • Influenza vaccination declined 01/10/2020   • Pneumococcal vaccination declined 01/10/2020   • Obesity (BMI 35.0-39.9 without comorbidity) ([facility]) 10/31/2018   • Obesity 04/06/2015   • Mixed hyperlipidemia 04/06/2015   • Impaired fasting glucose 04/06/2015       Current Outpatient Medications   Medication Sig Dispense Refill   • Cholecalciferol 4000 units Cap Take  by mouth.     • Ascorbic Acid 500 MG Cap Take  by mouth.     • CALCIUM PO Take  by mouth.     • Multiple Vitamin (M.V.I. ADULT) Injection by Intravenous route.     • nitrofurantoin monohyd macro (MACROBID) 100 MG Cap Take 1 Cap by mouth 2 times a day for 5 days. 10 Cap 0     No current facility-administered medications for this visit.             Current supplements as per medication list.       Allergies: Patient has no known allergies.    Current social contact/activities:      She  reports that she quit smoking about 24 years ago. She started smoking about 53 years ago. She smoked 1.00 pack per day for 0.00 years. She has never used smokeless tobacco. She reports current alcohol use. She reports that she does not use drugs.  Counseling given: Not Answered      DPA/Advanced Directive:  Patient has Advanced Directive on file.     ROS:    Gait: Uses no assistive device  Ostomy: No  Other tubes: No  Amputations: No  Chronic oxygen use: No  Last eye exam: May 2019  Wears hearing aids: No   : Reports urinary leakage during the last 6 months that has somewhat interfered with their daily activities or sleep.    Screening:    Depression Screening    Little interest or pleasure in doing things?  0 - not at all  Feeling down, depressed , or hopeless? 0 - not at all  Patient Health Questionnaire Score: 0     If depressive symptoms identified deferred to follow  up visit unless specifically addressed in assessment and plan.    Interpretation of PHQ-9 Total Score   Score Severity   1-4 No Depression   5-9 Mild Depression   10-14 Moderate Depression   15-19 Moderately Severe Depression   20-27 Severe Depression    Screening for Cognitive Impairment    Three Minute Recall (village, kitchen, baby) 3/3    John clock face with all 12 numbers and set the hands to show 10 past 10.  Yes    Cognitive concerns identified deferred for follow up unless specifically addressed in assessment and plan.    Fall Risk Assessment    Has the patient had two or more falls in the last year or any fall with injury in the last year?  No    Safety Assessment    Throw rugs on floor.  Yes  Handrails on all stairs.  No  Good lighting in all hallways.  Yes  Difficulty hearing.  Yes  Patient counseled about all safety risks that were identified.    Functional Assessment ADLs    Are there any barriers preventing you from cooking for yourself or meeting nutritional needs?  No.    Are there any barriers preventing you from driving safely or obtaining transportation?  No.    Are there any barriers preventing you from using a telephone or calling for help?  No.    Are there any barriers preventing you from shopping?  No.    Are there any barriers preventing you from taking care of your own finances?  No.    Are there any barriers preventing you from managing your medications?  No.    Are there any barriers preventing you from showering, bathing or dressing yourself?  No.    Are you currently engaging in any exercise or physical activity?  Yes.     What is your perception of your health?  Excellent.      Health Maintenance Summary                HEPATITIS C SCREENING Overdue 1945     IMM ZOSTER VACCINES Postponed 6/10/2020 Originally 3/18/1997. Patient Refused    IMM INFLUENZA Postponed 1/10/2021 Originally 9/1/2019. Patient Refused    IMM DTaP/Tdap/Td Vaccine Postponed 1/10/2021 Originally 3/18/1958.  "Patient Refused    IMM PNEUMOCOCCAL VACCINE: 65+ Years Postponed 1/10/2021 Originally 3/18/2012. Patient Refused    MAMMOGRAM Next Due 2020      Done 2019 MA-SCREENING MAMMO BILAT W/TOMOSYNTHESIS W/CAD     Patient has more history with this topic...    Annual Wellness Visit Next Due 1/10/2021      Done 1/10/2020 SUBSEQUENT ANNUAL WELLNESS VISIT-INCLUDES PPPS ()          Patient Care Team:  Quintin Jaquez M.D. as PCP - General (Family Medicine)        Social History     Tobacco Use   • Smoking status: Former Smoker     Packs/day: 1.00     Years: 0.00     Pack years: 0.00     Start date: 10/1/1966     Last attempt to quit: 1995     Years since quittin.1   • Smokeless tobacco: Never Used   Substance Use Topics   • Alcohol use: Yes     Alcohol/week: 0.0 oz     Comment: one glass of wine daily   • Drug use: No     Family History   Problem Relation Age of Onset   • Arthritis Maternal Grandmother    • Diabetes Maternal Grandmother      She  has a past medical history of Hyperlipidemia.   Past Surgical History:   Procedure Laterality Date   • DILATION AND CURETTAGE     • FOOT ORIF     • TONSILLECTOMY         Exam:   /60 (BP Location: Left arm, Patient Position: Sitting, BP Cuff Size: Large adult)   Pulse 75   Temp 36.6 °C (97.9 °F) (Temporal)   Resp 14   Ht 1.588 m (5' 2.5\")   Wt 93.9 kg (207 lb)   SpO2 97%  Body mass index is 37.26 kg/m².    Hearing excellent.    Dentition good  Alert, oriented in no acute distress.  Eye contact is good, speech goal directed, affect calm    Assessment and Plan. The following treatment and monitoring plan is recommended:    1. Obesity (BMI 35.0-39.9 without comorbidity) (HCC)  Patient identified as having weight management issue.  Appropriate orders and counseling given.    Subsequent Annual Wellness Visit - Includes PPPS ()   2. Mixed hyperlipidemia  Subsequent Annual Wellness Visit - Includes PPPS ()    Lipid Profile   3. Impaired fasting " glucose  Subsequent Annual Wellness Visit - Includes PPPS ()   4. Urinary frequency  URINE CULTURE(NEW)   5. Hematuria, unspecified type  URINE CULTURE(NEW)   6. Influenza vaccination declined     7. Pneumococcal vaccination declined     8. Elevated fasting glucose  HEMOGLOBIN A1C   Obesity (BMI 35.0-39.9 without comorbidity) ([facility])  Encouraged gradual weight loss with more plant based diet, portion control, regular exercise.     Mixed hyperlipidemia  Worsening on recent labs  Results for KATIE ZHANG (MRN [id number]) as of 1/10/2020 11:15   Ref. Range 1/6/2020 09:30   Cholesterol,Tot Latest Ref Range: 100 - 199 mg/dL 282 (H)   Triglycerides Latest Ref Range: 0 - 149 mg/dL 92   HDL Latest Ref Range: >=40 mg/dL 88   LDL Latest Ref Range: <100 mg/dL 176 (H)   Discussed ASCVD risk 14%  Advised addition of statin    Impaired fasting glucose  A1c improved to 5.5  Encouraged to continue healthy diet changes low in sugars and starches, regular exercise and gradual weight loss.     Influenza vaccination declined  Advised and education provided.       Pneumococcal vaccination declined  Advised and education provided.         Services suggested: No services needed at this time  Health Care Screening: Age-appropriate preventive services recommended by USPTF and ACIP covered by Medicare were discussed today. Services ordered if indicated and agreed upon by the patient.  Referrals offered: Community-based lifestyle interventions to reduce health risks and promote self-management and wellness, fall prevention, nutrition, physical activity, tobacco-use cessation, weight loss, and mental health services as per orders if indicated.    Discussion today about general wellness and lifestyle habits:    · Prevent falls and reduce trip hazards; Cautioned about securing or removing rugs.  · Have a working fire alarm and carbon monoxide detector;   · Engage in regular physical activity and social activities     Follow-up: No follow-ups on  file.

## 2020-01-10 NOTE — ASSESSMENT & PLAN NOTE
Worsening on recent labs  Results for KATIE ZHANG (MRN [id number]) as of 1/10/2020 11:15   Ref. Range 1/6/2020 09:30   Cholesterol,Tot Latest Ref Range: 100 - 199 mg/dL 282 (H)   Triglycerides Latest Ref Range: 0 - 149 mg/dL 92   HDL Latest Ref Range: >=40 mg/dL 88   LDL Latest Ref Range: <100 mg/dL 176 (H)   Discussed ASCVD risk 14%  Advised addition of statin

## 2020-01-12 LAB
BACTERIA UR CULT: NORMAL
SIGNIFICANT IND 70042: NORMAL
SITE SITE: NORMAL
SOURCE SOURCE: NORMAL

## 2020-07-14 ENCOUNTER — HOSPITAL ENCOUNTER (OUTPATIENT)
Dept: LAB | Facility: MEDICAL CENTER | Age: 75
End: 2020-07-14
Attending: FAMILY MEDICINE
Payer: MEDICARE

## 2020-07-14 DIAGNOSIS — E78.2 MIXED HYPERLIPIDEMIA: ICD-10-CM

## 2020-07-14 DIAGNOSIS — R73.01 ELEVATED FASTING GLUCOSE: ICD-10-CM

## 2020-07-14 LAB
CHOLEST SERPL-MCNC: 276 MG/DL (ref 100–199)
EST. AVERAGE GLUCOSE BLD GHB EST-MCNC: 111 MG/DL
FASTING STATUS PATIENT QL REPORTED: NORMAL
HBA1C MFR BLD: 5.5 % (ref 0–5.6)
HDLC SERPL-MCNC: 84 MG/DL
LDLC SERPL CALC-MCNC: 174 MG/DL
TRIGL SERPL-MCNC: 92 MG/DL (ref 0–149)

## 2020-07-14 PROCEDURE — 36415 COLL VENOUS BLD VENIPUNCTURE: CPT

## 2020-07-14 PROCEDURE — 83036 HEMOGLOBIN GLYCOSYLATED A1C: CPT | Mod: GA

## 2020-07-14 PROCEDURE — 80061 LIPID PANEL: CPT

## 2020-07-21 ENCOUNTER — OFFICE VISIT (OUTPATIENT)
Dept: MEDICAL GROUP | Facility: PHYSICIAN GROUP | Age: 75
End: 2020-07-21
Payer: MEDICARE

## 2020-07-21 VITALS
OXYGEN SATURATION: 95 % | RESPIRATION RATE: 12 BRPM | DIASTOLIC BLOOD PRESSURE: 114 MMHG | SYSTOLIC BLOOD PRESSURE: 200 MMHG | BODY MASS INDEX: 36.86 KG/M2 | HEART RATE: 78 BPM | WEIGHT: 208 LBS | HEIGHT: 63 IN | TEMPERATURE: 98.1 F

## 2020-07-21 DIAGNOSIS — R03.0 WHITE COAT SYNDROME WITHOUT DIAGNOSIS OF HYPERTENSION: ICD-10-CM

## 2020-07-21 DIAGNOSIS — Z53.20 STATIN DECLINED: ICD-10-CM

## 2020-07-21 DIAGNOSIS — Z12.31 ENCOUNTER FOR SCREENING MAMMOGRAM FOR BREAST CANCER: ICD-10-CM

## 2020-07-21 DIAGNOSIS — E66.9 OBESITY (BMI 35.0-39.9 WITHOUT COMORBIDITY): ICD-10-CM

## 2020-07-21 DIAGNOSIS — R53.83 FATIGUE, UNSPECIFIED TYPE: ICD-10-CM

## 2020-07-21 DIAGNOSIS — E78.2 MIXED HYPERLIPIDEMIA: ICD-10-CM

## 2020-07-21 PROBLEM — I10 ESSENTIAL HYPERTENSION: Status: ACTIVE | Noted: 2020-07-21

## 2020-07-21 PROCEDURE — 99214 OFFICE O/P EST MOD 30 MIN: CPT | Performed by: FAMILY MEDICINE

## 2020-07-21 RX ORDER — M-VIT,TX,IRON,MINS/CALC/FOLIC 27MG-0.4MG
1 TABLET ORAL DAILY
COMMUNITY

## 2020-07-21 NOTE — PROGRESS NOTES
"Subjective:   Katie Zhang is a 75 y.o. female here today for evaluation and management of:     White coat syndrome without diagnosis of hypertension  She runs 130/70s for her BPs at home. She notes MA cranks up the BP cuff too tight and is causes her pain and she gets angry.   She will send me a BP from home via [email or web address] today. In clinic BP today is 200/114    Mixed hyperlipidemia  Recent labs show slight dec in lipids.   Results for KATIE ZHANG (MRN [id number]) as of 7/21/2020 10:54   Ref. Range 1/6/2020 09:30 1/10/2020 11:30 7/14/2020 08:11   Cholesterol,Tot Latest Ref Range: 100 - 199 mg/dL 282 (H)  276 (H)   Triglycerides Latest Ref Range: 0 - 149 mg/dL 92  92   HDL Latest Ref Range: >=40 mg/dL 88  84   LDL Latest Ref Range: <100 mg/dL 176 (H)  174 (H)   No family hx of heart attacks or strokes.  Reviewed her ASCVD risk again of 14. She declines a statin.  Would like to continue exercise and weight loss and heart healthy diet.     Obesity (BMI 35.0-39.9 without comorbidity) (HCC)  No acute changes.   Encouraged to continue with walking, weight loss.   They lost their 14 year old dog who was her walking .          Current medicines (including changes today)  Current Outpatient Medications   Medication Sig Dispense Refill   • therapeutic multivitamin-minerals (THERAGRAN-M) Tab Take 1 Tab by mouth every day.     • Cholecalciferol 4000 units Cap Take  by mouth.     • Ascorbic Acid 500 MG Cap Take  by mouth.     • CALCIUM PO Take  by mouth.       No current facility-administered medications for this visit.      She  has a past medical history of Hyperlipidemia.    ROS  No chest pain, no shortness of breath, no abdominal pain       Objective:     BP (!) 200/114   Pulse 78   Temp 36.7 °C (98.1 °F) (Temporal)   Resp 12   Ht 1.6 m (5' 3\")   Wt 94.3 kg (208 lb)   SpO2 95%  Body mass index is 36.85 kg/m².   Physical Exam:  Constitutional: Alert, no distress.  Skin: Warm, dry, good turgor, no rashes in visible " areas.  Eye: Equal, round and reactive, conjunctiva clear, lids normal.  ENMT: Lips without lesions, good dentition, oropharynx clear.  Neck: Trachea midline, no masses, no thyromegaly. No cervical or supraclavicular lymphadenopathy  Respiratory: Unlabored respiratory effort  Psych: Alert and oriented x3, normal affect and mood.        Assessment and Plan:   The following treatment plan was discussed    1. Encounter for screening mammogram for breast cancer  - MA-SCREENING MAMMO BILAT W/CAD; Future    2. White coat syndrome without diagnosis of hypertension  She will continue to monitor BP at home and send me results today via [email or web address].   ER precautions discussed.     3. Mixed hyperlipidemia  Declines statin  - Comp Metabolic Panel; Future  - Lipid Profile; Future    4. Obesity (BMI 35.0-39.9 without comorbidity) (HCC)  Encouraged to restart walking.     5. Statin declined  Continue to monitor lipids.     6. Fatigue, unspecified type  - VITAMIN D,25 HYDROXY; Future  - TSH WITH REFLEX TO FT4; Future      Followup: Return in about 1 year (around 7/21/2021) for dyslipidemia, elevated BP.

## 2020-07-21 NOTE — RESULT ENCOUNTER NOTE
Rosa,  Your labs show cholesterol levels still high but with some improvement! A1c is excellent.   Quintin Jaquez M.D.

## 2020-07-21 NOTE — ASSESSMENT & PLAN NOTE
No acute changes.   Encouraged to continue with walking, weight loss.   They lost their 14 year old dog who was her walking .

## 2020-07-21 NOTE — ASSESSMENT & PLAN NOTE
Recent labs show slight dec in lipids.   Results for KATIE ZHANG (MRN [id number]) as of 7/21/2020 10:54   Ref. Range 1/6/2020 09:30 1/10/2020 11:30 7/14/2020 08:11   Cholesterol,Tot Latest Ref Range: 100 - 199 mg/dL 282 (H)  276 (H)   Triglycerides Latest Ref Range: 0 - 149 mg/dL 92  92   HDL Latest Ref Range: >=40 mg/dL 88  84   LDL Latest Ref Range: <100 mg/dL 176 (H)  174 (H)   No family hx of heart attacks or strokes.  Reviewed her ASCVD risk again of 14. She declines a statin.  Would like to continue exercise and weight loss and heart healthy diet.

## 2020-09-17 ENCOUNTER — HOSPITAL ENCOUNTER (OUTPATIENT)
Dept: RADIOLOGY | Facility: MEDICAL CENTER | Age: 75
End: 2020-09-17
Attending: FAMILY MEDICINE
Payer: MEDICARE

## 2020-09-17 DIAGNOSIS — Z12.31 VISIT FOR SCREENING MAMMOGRAM: ICD-10-CM

## 2020-09-17 DIAGNOSIS — R92.8 ABNORMAL MAMMOGRAM: ICD-10-CM

## 2020-09-17 PROCEDURE — 77067 SCR MAMMO BI INCL CAD: CPT

## 2020-09-17 NOTE — RESULT ENCOUNTER NOTE
Please advise patient that I reviewed screening mammogram results. There are some results that need further evaluation. I have ordered a diagnostic mammogram and ultrasound of right breast for a closer look.   Quintin Jaquez M.D.

## 2020-09-22 ENCOUNTER — HOSPITAL ENCOUNTER (OUTPATIENT)
Dept: RADIOLOGY | Facility: MEDICAL CENTER | Age: 75
End: 2020-09-22
Attending: FAMILY MEDICINE
Payer: MEDICARE

## 2020-09-22 DIAGNOSIS — R92.8 ABNORMAL MAMMOGRAM: ICD-10-CM

## 2020-09-22 PROCEDURE — G0279 TOMOSYNTHESIS, MAMMO: HCPCS | Mod: RT

## 2020-09-22 PROCEDURE — 76642 ULTRASOUND BREAST LIMITED: CPT | Mod: RT

## 2021-01-14 ENCOUNTER — TELEPHONE (OUTPATIENT)
Dept: MEDICAL GROUP | Facility: PHYSICIAN GROUP | Age: 76
End: 2021-01-14

## 2021-01-14 ENCOUNTER — PATIENT MESSAGE (OUTPATIENT)
Dept: MEDICAL GROUP | Facility: PHYSICIAN GROUP | Age: 76
End: 2021-01-14

## 2021-01-15 NOTE — PROGRESS NOTES
Please advise pt to call for March imaging appointment for 6 month follow up after last Breast US.   Thank you.   Quintin Jaquez M.D.

## 2021-03-22 ENCOUNTER — HOSPITAL ENCOUNTER (OUTPATIENT)
Dept: RADIOLOGY | Facility: MEDICAL CENTER | Age: 76
End: 2021-03-22
Attending: FAMILY MEDICINE
Payer: MEDICARE

## 2021-03-22 DIAGNOSIS — R92.8 ABNORMAL MAMMOGRAM: ICD-10-CM

## 2021-03-22 PROCEDURE — G0279 TOMOSYNTHESIS, MAMMO: HCPCS | Mod: RT

## 2021-03-22 PROCEDURE — 76642 ULTRASOUND BREAST LIMITED: CPT | Mod: RT

## 2021-03-23 DIAGNOSIS — R92.8 ABNORMAL FINDING ON BREAST IMAGING: ICD-10-CM

## 2021-03-23 NOTE — RESULT ENCOUNTER NOTE
Released to [facility]  Your breast ultrasound s showed that the area in the right breast is stable and likely benign (not cancer). Next is due in 6 months to keep an eye on it.  Please let me know immediately if you notice any new lumps/rashes/pain/nipple discharge.  Quintin Jaquez M.D.

## 2021-07-06 SDOH — HEALTH STABILITY: MENTAL HEALTH
STRESS IS WHEN SOMEONE FEELS TENSE, NERVOUS, ANXIOUS, OR CAN'T SLEEP AT NIGHT BECAUSE THEIR MIND IS TROUBLED. HOW STRESSED ARE YOU?: NOT AT ALL

## 2021-07-06 SDOH — ECONOMIC STABILITY: HOUSING INSECURITY: IN THE LAST 12 MONTHS, HOW MANY PLACES HAVE YOU LIVED?: 1

## 2021-07-06 SDOH — ECONOMIC STABILITY: HOUSING INSECURITY
IN THE LAST 12 MONTHS, WAS THERE A TIME WHEN YOU DID NOT HAVE A STEADY PLACE TO SLEEP OR SLEPT IN A SHELTER (INCLUDING NOW)?: NO

## 2021-07-06 SDOH — HEALTH STABILITY: PHYSICAL HEALTH: ON AVERAGE, HOW MANY DAYS PER WEEK DO YOU ENGAGE IN MODERATE TO STRENUOUS EXERCISE (LIKE A BRISK WALK)?: 7 DAYS

## 2021-07-06 SDOH — ECONOMIC STABILITY: TRANSPORTATION INSECURITY
IN THE PAST 12 MONTHS, HAS THE LACK OF TRANSPORTATION KEPT YOU FROM MEDICAL APPOINTMENTS OR FROM GETTING MEDICATIONS?: NO

## 2021-07-06 SDOH — ECONOMIC STABILITY: TRANSPORTATION INSECURITY
IN THE PAST 12 MONTHS, HAS LACK OF RELIABLE TRANSPORTATION KEPT YOU FROM MEDICAL APPOINTMENTS, MEETINGS, WORK OR FROM GETTING THINGS NEEDED FOR DAILY LIVING?: NO

## 2021-07-06 SDOH — ECONOMIC STABILITY: FOOD INSECURITY: WITHIN THE PAST 12 MONTHS, YOU WORRIED THAT YOUR FOOD WOULD RUN OUT BEFORE YOU GOT MONEY TO BUY MORE.: NEVER TRUE

## 2021-07-06 SDOH — ECONOMIC STABILITY: INCOME INSECURITY: IN THE LAST 12 MONTHS, WAS THERE A TIME WHEN YOU WERE NOT ABLE TO PAY THE MORTGAGE OR RENT ON TIME?: NO

## 2021-07-06 SDOH — HEALTH STABILITY: PHYSICAL HEALTH: ON AVERAGE, HOW MANY MINUTES DO YOU ENGAGE IN EXERCISE AT THIS LEVEL?: 30 MIN

## 2021-07-06 SDOH — ECONOMIC STABILITY: FOOD INSECURITY: WITHIN THE PAST 12 MONTHS, THE FOOD YOU BOUGHT JUST DIDN'T LAST AND YOU DIDN'T HAVE MONEY TO GET MORE.: NEVER TRUE

## 2021-07-06 SDOH — ECONOMIC STABILITY: TRANSPORTATION INSECURITY
IN THE PAST 12 MONTHS, HAS LACK OF TRANSPORTATION KEPT YOU FROM MEETINGS, WORK, OR FROM GETTING THINGS NEEDED FOR DAILY LIVING?: NO

## 2021-07-06 ASSESSMENT — SOCIAL DETERMINANTS OF HEALTH (SDOH)
IN A TYPICAL WEEK, HOW MANY TIMES DO YOU TALK ON THE PHONE WITH FAMILY, FRIENDS, OR NEIGHBORS?: MORE THAN THREE TIMES A WEEK
HOW OFTEN DO YOU GET TOGETHER WITH FRIENDS OR RELATIVES?: ONCE A WEEK
HOW OFTEN DO YOU ATTENT MEETINGS OF THE CLUB OR ORGANIZATION YOU BELONG TO?: PATIENT DECLINED
HOW OFTEN DO YOU ATTEND CHURCH OR RELIGIOUS SERVICES?: NEVER
HOW OFTEN DO YOU HAVE SIX OR MORE DRINKS ON ONE OCCASION: NEVER
HOW OFTEN DO YOU ATTEND CHURCH OR RELIGIOUS SERVICES?: NEVER
HOW OFTEN DO YOU ATTENT MEETINGS OF THE CLUB OR ORGANIZATION YOU BELONG TO?: PATIENT DECLINED
HOW OFTEN DO YOU GET TOGETHER WITH FRIENDS OR RELATIVES?: ONCE A WEEK
HOW OFTEN DO YOU HAVE A DRINK CONTAINING ALCOHOL: MONTHLY OR LESS
IN A TYPICAL WEEK, HOW MANY TIMES DO YOU TALK ON THE PHONE WITH FAMILY, FRIENDS, OR NEIGHBORS?: MORE THAN THREE TIMES A WEEK
WITHIN THE PAST 12 MONTHS, YOU WORRIED THAT YOUR FOOD WOULD RUN OUT BEFORE YOU GOT THE MONEY TO BUY MORE: NEVER TRUE
DO YOU BELONG TO ANY CLUBS OR ORGANIZATIONS SUCH AS CHURCH GROUPS UNIONS, FRATERNAL OR ATHLETIC GROUPS, OR SCHOOL GROUPS?: PATIENT DECLINED
HOW MANY DRINKS CONTAINING ALCOHOL DO YOU HAVE ON A TYPICAL DAY WHEN YOU ARE DRINKING: PATIENT DECLINED
DO YOU BELONG TO ANY CLUBS OR ORGANIZATIONS SUCH AS CHURCH GROUPS UNIONS, FRATERNAL OR ATHLETIC GROUPS, OR SCHOOL GROUPS?: PATIENT DECLINED

## 2021-07-06 ASSESSMENT — LIFESTYLE VARIABLES
HOW OFTEN DO YOU HAVE SIX OR MORE DRINKS ON ONE OCCASION: NEVER
HOW MANY STANDARD DRINKS CONTAINING ALCOHOL DO YOU HAVE ON A TYPICAL DAY: PATIENT DECLINED
HOW OFTEN DO YOU HAVE A DRINK CONTAINING ALCOHOL: MONTHLY OR LESS

## 2021-07-07 ENCOUNTER — HOSPITAL ENCOUNTER (OUTPATIENT)
Dept: LAB | Facility: MEDICAL CENTER | Age: 76
End: 2021-07-07
Attending: FAMILY MEDICINE
Payer: MEDICARE

## 2021-07-07 DIAGNOSIS — E78.2 MIXED HYPERLIPIDEMIA: ICD-10-CM

## 2021-07-07 DIAGNOSIS — R53.83 FATIGUE, UNSPECIFIED TYPE: ICD-10-CM

## 2021-07-07 PROCEDURE — 36415 COLL VENOUS BLD VENIPUNCTURE: CPT

## 2021-07-07 PROCEDURE — 80061 LIPID PANEL: CPT

## 2021-07-07 PROCEDURE — 80053 COMPREHEN METABOLIC PANEL: CPT

## 2021-07-07 PROCEDURE — 84443 ASSAY THYROID STIM HORMONE: CPT

## 2021-07-08 LAB
ALBUMIN SERPL BCP-MCNC: 4.2 G/DL (ref 3.2–4.9)
ALBUMIN/GLOB SERPL: 1.6 G/DL
ALP SERPL-CCNC: 83 U/L (ref 30–99)
ALT SERPL-CCNC: 16 U/L (ref 2–50)
ANION GAP SERPL CALC-SCNC: 11 MMOL/L (ref 7–16)
AST SERPL-CCNC: 28 U/L (ref 12–45)
BILIRUB SERPL-MCNC: 0.9 MG/DL (ref 0.1–1.5)
BUN SERPL-MCNC: 12 MG/DL (ref 8–22)
CALCIUM SERPL-MCNC: 9.1 MG/DL (ref 8.5–10.5)
CHLORIDE SERPL-SCNC: 107 MMOL/L (ref 96–112)
CHOLEST SERPL-MCNC: 264 MG/DL (ref 100–199)
CO2 SERPL-SCNC: 24 MMOL/L (ref 20–33)
CREAT SERPL-MCNC: 0.64 MG/DL (ref 0.5–1.4)
FASTING STATUS PATIENT QL REPORTED: NORMAL
GLOBULIN SER CALC-MCNC: 2.6 G/DL (ref 1.9–3.5)
GLUCOSE SERPL-MCNC: 103 MG/DL (ref 65–99)
HDLC SERPL-MCNC: 95 MG/DL
LDLC SERPL CALC-MCNC: 155 MG/DL
POTASSIUM SERPL-SCNC: 4.5 MMOL/L (ref 3.6–5.5)
PROT SERPL-MCNC: 6.8 G/DL (ref 6–8.2)
SODIUM SERPL-SCNC: 142 MMOL/L (ref 135–145)
TRIGL SERPL-MCNC: 71 MG/DL (ref 0–149)
TSH SERPL DL<=0.005 MIU/L-ACNC: 4.29 UIU/ML (ref 0.38–5.33)

## 2021-07-13 ENCOUNTER — OFFICE VISIT (OUTPATIENT)
Dept: MEDICAL GROUP | Facility: PHYSICIAN GROUP | Age: 76
End: 2021-07-13
Payer: MEDICARE

## 2021-07-13 VITALS
OXYGEN SATURATION: 96 % | WEIGHT: 210 LBS | RESPIRATION RATE: 20 BRPM | HEIGHT: 63 IN | DIASTOLIC BLOOD PRESSURE: 98 MMHG | BODY MASS INDEX: 37.21 KG/M2 | HEART RATE: 79 BPM | SYSTOLIC BLOOD PRESSURE: 142 MMHG | TEMPERATURE: 97.7 F

## 2021-07-13 DIAGNOSIS — I10 BENIGN ESSENTIAL HTN: ICD-10-CM

## 2021-07-13 DIAGNOSIS — E66.9 OBESITY (BMI 30-39.9): ICD-10-CM

## 2021-07-13 DIAGNOSIS — R73.01 IMPAIRED FASTING GLUCOSE: ICD-10-CM

## 2021-07-13 DIAGNOSIS — Z12.11 SCREENING FOR COLON CANCER: ICD-10-CM

## 2021-07-13 DIAGNOSIS — E78.2 MIXED HYPERLIPIDEMIA: ICD-10-CM

## 2021-07-13 DIAGNOSIS — E66.9 OBESITY (BMI 35.0-39.9 WITHOUT COMORBIDITY): ICD-10-CM

## 2021-07-13 DIAGNOSIS — Z28.21 PNEUMOCOCCAL VACCINATION DECLINED: ICD-10-CM

## 2021-07-13 PROBLEM — Z53.20 STATIN DECLINED: Status: RESOLVED | Noted: 2020-07-21 | Resolved: 2021-07-13

## 2021-07-13 PROCEDURE — G0439 PPPS, SUBSEQ VISIT: HCPCS | Performed by: FAMILY MEDICINE

## 2021-07-13 RX ORDER — LISINOPRIL 20 MG/1
20 TABLET ORAL DAILY
Qty: 90 TABLET | Refills: 3 | Status: SHIPPED
Start: 2021-07-13 | End: 2021-10-12

## 2021-07-13 RX ORDER — ATORVASTATIN CALCIUM 20 MG/1
20 TABLET, FILM COATED ORAL EVERY EVENING
Qty: 90 TABLET | Refills: 3 | Status: SHIPPED | OUTPATIENT
Start: 2021-07-13 | End: 2022-06-28 | Stop reason: SDUPTHER

## 2021-07-13 ASSESSMENT — PATIENT HEALTH QUESTIONNAIRE - PHQ9: CLINICAL INTERPRETATION OF PHQ2 SCORE: 0

## 2021-07-13 ASSESSMENT — ENCOUNTER SYMPTOMS: GENERAL WELL-BEING: EXCELLENT

## 2021-07-13 ASSESSMENT — ACTIVITIES OF DAILY LIVING (ADL): BATHING_REQUIRES_ASSISTANCE: 0

## 2021-07-13 NOTE — ASSESSMENT & PLAN NOTE
Encouraged patient to continue with portion sizes smaller, avoiding processed foods and continuing with regular exercise.

## 2021-07-13 NOTE — ASSESSMENT & PLAN NOTE
Patient continues to exercise regularly, walks a mile to mile and a half and I have been counseling her on benefits of a statin.   She is willing to start one.   Will start with lipitor which her  is on also.   ascvd risk is 17.8  She does not smoke.   Her blood pressure is elevated.   Results for KATIE ZHANG (MRN [id number]) as of 7/13/2021 11:07   Ref. Range 7/14/2020 08:11 7/7/2021 08:50   Cholesterol,Tot Latest Ref Range: 100 - 199 mg/dL 276 (H) 264 (H)   Triglycerides Latest Ref Range: 0 - 149 mg/dL 92 71   HDL Latest Ref Range: >=40 mg/dL 84 95   LDL Latest Ref Range: <100 mg/dL 174 (H) 155 (H)

## 2021-07-13 NOTE — PROGRESS NOTES
"Subjective:      Rosa Riley is a 76 y.o. female who presents with Annual Exam            HPI she notes her home BPs are elevated 140 to 150s    ROS she feels well, no CP, SOB or LE edema       Objective:     /98   Pulse 79   Temp 36.5 °C (97.7 °F) (Temporal)   Resp 20   Ht 1.6 m (5' 3\")   Wt 95.3 kg (210 lb)   SpO2 96%   BMI 37.20 kg/m²      Physical Exam  Physical Exam:  Constitutional: Alert, no distress, well-groomed.  Skin: No rashes in visible areas.  Eye: Round. Conjunctiva clear, lids normal. No icterus.   ENMT: Lips pink without lesions, good dentition, moist mucous membranes. Phonation normal.  Neck: No masses, no thyromegaly. Moves freely without pain.  Respiratory: Unlabored respiratory effort, no cough or audible wheeze  Psych: Alert and oriented x3, normal affect and mood.                   Assessment/Plan:        There are no diagnoses linked to this encounter.    Chief Complaint   Patient presents with   • Annual Exam       HPI:  Rosa Riley is a 76 y.o. here for Medicare Annual Wellness Visit     Patient Active Problem List    Diagnosis Date Noted   • White coat syndrome without diagnosis of hypertension 07/21/2020   • Statin declined 07/21/2020   • Influenza vaccination declined 01/10/2020   • Pneumococcal vaccination declined 01/10/2020   • Obesity (BMI 35.0-39.9 without comorbidity) ([facility]) 10/31/2018   • Obesity 04/06/2015   • Mixed hyperlipidemia 04/06/2015   • Impaired fasting glucose 04/06/2015       Current Outpatient Medications   Medication Sig Dispense Refill   • therapeutic multivitamin-minerals (THERAGRAN-M) Tab Take 1 Tab by mouth every day.     • Cholecalciferol 4000 units Cap Take  by mouth.     • Ascorbic Acid 500 MG Cap Take  by mouth.     • CALCIUM PO Take  by mouth.       No current facility-administered medications for this visit.          Current supplements as per medication list.     Allergies: Patient has no known allergies.    Current social contact/activities: "      She  reports that she quit smoking about 25 years ago. She started smoking about 54 years ago. She smoked 1.00 pack per day for 0.00 years. She has never used smokeless tobacco. She reports current alcohol use. She reports that she does not use drugs.  Counseling given: Not Answered      DPA/Advanced Directive:  Patient has Advanced Directive on file.     ROS:    Gait: Uses no assistive device  Ostomy: No  Other tubes: No  Amputations: No  Chronic oxygen use: No  Last eye exam: 05/2021  Wears hearing aids: No   : Denies any urinary leakage during the last 6 months    Screening:    Depression Screening    Little interest or pleasure in doing things?  0 - not at all  Feeling down, depressed , or hopeless? 0 - not at all  Patient Health Questionnaire Score: 0     If depressive symptoms identified deferred to follow up visit unless specifically addressed in assessment and plan.    Interpretation of PHQ-9 Total Score   Score Severity   1-4 No Depression   5-9 Mild Depression   10-14 Moderate Depression   15-19 Moderately Severe Depression   20-27 Severe Depression    Screening for Cognitive Impairment    Three Minute Recall (captain, garden, picture) 2/3    John clock face with all 12 numbers and set the hands to show 5 past 8.  Yes    Cognitive concerns identified deferred for follow up unless specifically addressed in assessment and plan.    Fall Risk Assessment    Has the patient had two or more falls in the last year or any fall with injury in the last year?  No    Safety Assessment    Throw rugs on floor.  Yes  Handrails on all stairs.  Yes  Good lighting in all hallways.  Yes  Difficulty hearing.  No  Patient counseled about all safety risks that were identified.    Functional Assessment ADLs    Are there any barriers preventing you from cooking for yourself or meeting nutritional needs?  No.    Are there any barriers preventing you from driving safely or obtaining transportation?  No.    Are there any  "barriers preventing you from using a telephone or calling for help?  No.    Are there any barriers preventing you from shopping?  No.    Are there any barriers preventing you from taking care of your own finances?  No.    Are there any barriers preventing you from managing your medications?  No.    Are there any barriers preventing you from showering, bathing or dressing yourself?  No.    Are you currently engaging in any exercise or physical activity?  Yes.     What is your perception of your health?  Excellent.      Health Maintenance Summary                IMM DTaP/Tdap/Td Vaccine Overdue 3/18/1966     IMM PNEUMOCOCCAL VACCINE: 65+ Years Overdue 3/18/2012     Annual Wellness Visit Overdue 1/10/2021      Done 1/10/2020 SUBSEQUENT ANNUAL WELLNESS VISIT-INCLUDES PPPS ()    IMM INFLUENZA Next Due 2021      Done 10/7/2020 Ext Imm: Fluzone High-Dose Quad    MAMMOGRAM Next Due 2021      Done 2020 MA-SCREENING MAMMO BILAT W/TOMOSYNTHESIS W/CAD     Patient has more history with this topic...          Patient Care Team:  Quintin Jaquez M.D. as PCP - General (Family Medicine)        Social History     Tobacco Use   • Smoking status: Former Smoker     Packs/day: 1.00     Years: 0.00     Pack years: 0.00     Start date: 10/1/1966     Quit date: 1995     Years since quittin.6   • Smokeless tobacco: Never Used   Vaping Use   • Vaping Use: Never used   Substance Use Topics   • Alcohol use: Yes     Alcohol/week: 0.0 oz     Comment: one glass of wine daily   • Drug use: No     Family History   Problem Relation Age of Onset   • Arthritis Maternal Grandmother    • Diabetes Maternal Grandmother      She  has a past medical history of Hyperlipidemia.   Past Surgical History:   Procedure Laterality Date   • DILATION AND CURETTAGE     • FOOT ORIF     • TONSILLECTOMY         Exam:   /98   Pulse 79   Temp 36.5 °C (97.7 °F) (Temporal)   Resp 20   Ht 1.6 m (5' 3\")   Wt 95.3 kg (210 lb)   SpO2 96%  " Body mass index is 37.2 kg/m².    Hearing good.    Dentition good  Alert, oriented in no acute distress.  Eye contact is good, speech goal directed, affect calm    Assessment and Plan. The following treatment and monitoring plan is recommended:    Mixed hyperlipidemia  Patient continues to exercise regularly, walks a mile to mile and a half and I have been counseling her on benefits of a statin.   She is willing to start one.   Will start with lipitor which her  is on also.   ascvd risk is 17.8  She does not smoke.   Her blood pressure is elevated.   Results for KATIE ZHANG (MRN [id number]) as of 7/13/2021 11:07   Ref. Range 7/14/2020 08:11 7/7/2021 08:50   Cholesterol,Tot Latest Ref Range: 100 - 199 mg/dL 276 (H) 264 (H)   Triglycerides Latest Ref Range: 0 - 149 mg/dL 92 71   HDL Latest Ref Range: >=40 mg/dL 84 95   LDL Latest Ref Range: <100 mg/dL 174 (H) 155 (H)       Benign essential HTN  Patient notes bp at home is 140s and 150s now.   Will start with lisinopril 20. Advised on potential side effects.       Obesity (BMI 35.0-39.9 without comorbidity) ([facility])  Encouraged patient to continue with portion sizes smaller, avoiding processed foods and continuing with regular exercise.     Pneumococcal vaccination declined  Patient declines pneumonia vaccination, advised on benefits and education provided. She did receive her first flu shot and also received her covid vaccinations.     Impaired fasting glucose  Encouraged diet changes, continue regular exercise.     Services suggested: No services needed at this time  Health Care Screening: Age-appropriate preventive services recommended by USPTF and ACIP covered by Medicare were discussed today. Services ordered if indicated and agreed upon by the patient.  Referrals offered: Community-based lifestyle interventions to reduce health risks and promote self-management and wellness, fall prevention, nutrition, physical activity, tobacco-use cessation, weight loss, and  mental health services as per orders if indicated.    Discussion today about general wellness and lifestyle habits:    · Prevent falls and reduce trip hazards; Cautioned about securing or removing rugs.  · Have a working fire alarm and carbon monoxide detector;   · Engage in regular physical activity and social activities     Follow-up: No follow-ups on file.

## 2021-07-13 NOTE — ASSESSMENT & PLAN NOTE
Patient declines pneumonia vaccination, advised on benefits and education provided. She did receive her first flu shot and also received her covid vaccinations.

## 2021-07-13 NOTE — ASSESSMENT & PLAN NOTE
Patient notes bp at home is 140s and 150s now.   Will start with lisinopril 20. Advised on potential side effects.

## 2021-10-12 ENCOUNTER — OFFICE VISIT (OUTPATIENT)
Dept: MEDICAL GROUP | Facility: PHYSICIAN GROUP | Age: 76
End: 2021-10-12
Payer: MEDICARE

## 2021-10-12 VITALS
TEMPERATURE: 97.1 F | BODY MASS INDEX: 36.46 KG/M2 | SYSTOLIC BLOOD PRESSURE: 122 MMHG | RESPIRATION RATE: 20 BRPM | HEIGHT: 63 IN | DIASTOLIC BLOOD PRESSURE: 78 MMHG | WEIGHT: 205.8 LBS | OXYGEN SATURATION: 97 % | HEART RATE: 80 BPM

## 2021-10-12 DIAGNOSIS — I10 BENIGN ESSENTIAL HTN: ICD-10-CM

## 2021-10-12 DIAGNOSIS — E78.2 MIXED HYPERLIPIDEMIA: ICD-10-CM

## 2021-10-12 PROCEDURE — 99214 OFFICE O/P EST MOD 30 MIN: CPT | Performed by: FAMILY MEDICINE

## 2021-10-12 RX ORDER — LISINOPRIL 30 MG/1
30 TABLET ORAL DAILY
Qty: 90 TABLET | Refills: 3 | Status: SHIPPED | OUTPATIENT
Start: 2021-10-12 | End: 2022-06-28 | Stop reason: SDUPTHER

## 2021-10-12 NOTE — ASSESSMENT & PLAN NOTE
Patient is tolerating atorvastatin 20 with no myopathy or myalgia.  LFTs in July were normal.  Will recheck labs in 6 months.  Results for KATIE ZHANG (MRN [id number]) as of 10/12/2021 10:03   Ref. Range 7/7/2021 08:50   Cholesterol,Tot Latest Ref Range: 100 - 199 mg/dL 264 (H)   Triglycerides Latest Ref Range: 0 - 149 mg/dL 71   HDL Latest Ref Range: >=40 mg/dL 95   LDL Latest Ref Range: <100 mg/dL 155 (H)

## 2021-10-12 NOTE — PROGRESS NOTES
"Subjective:   Katie Zhang is a 76 y.o. female here today for evaluation and management of:     Benign essential HTN  She has some increased blood pressures to 140s and 90s at home as evidenced by her blood pressure log which she brings in with her today.  She also has loose to 116/88 systolic will increase her lisinopril slightly to 30 mg from 20.  Also she can take her lisinopril in the morning instead of at night and see if this helps.  She has no cough with the lisinopril.  He has no chest pain, no lightheadedness no leg swelling.    Mixed hyperlipidemia  Patient is tolerating atorvastatin 20 with no myopathy or myalgia.  LFTs in July were normal.  Will recheck labs in 6 months.  Results for KATIE ZHANG (MRN [id number]) as of 10/12/2021 10:03   Ref. Range 7/7/2021 08:50   Cholesterol,Tot Latest Ref Range: 100 - 199 mg/dL 264 (H)   Triglycerides Latest Ref Range: 0 - 149 mg/dL 71   HDL Latest Ref Range: >=40 mg/dL 95   LDL Latest Ref Range: <100 mg/dL 155 (H)          Current medicines (including changes today)  Current Outpatient Medications   Medication Sig Dispense Refill   • lisinopril (PRINIVIL) 30 MG tablet Take 1 Tablet by mouth every day. 90 Tablet 3   • atorvastatin (LIPITOR) 20 MG Tab Take 1 tablet by mouth every evening. 90 tablet 3   • therapeutic multivitamin-minerals (THERAGRAN-M) Tab Take 1 Tab by mouth every day.     • Cholecalciferol 4000 units Cap Take  by mouth.     • Ascorbic Acid 500 MG Cap Take  by mouth.     • CALCIUM PO Take  by mouth.       No current facility-administered medications for this visit.     She  has a past medical history of Hyperlipidemia.    ROS  No chest pain, no shortness of breath, no abdominal pain       Objective:     /78 (BP Location: Left arm, Patient Position: Sitting, BP Cuff Size: Large adult)   Pulse 80   Temp 36.2 °C (97.1 °F) (Temporal)   Resp 20   Ht 1.6 m (5' 3\")   Wt 93.4 kg (205 lb 12.8 oz)   SpO2 97%  Body mass index is 36.46 kg/m².   Physical " Exam:  Constitutional: Alert, no distress.  Skin: Warm, dry, good turgor, no rashes in visible areas.  Eye: Equal, round and reactive, conjunctiva clear, lids normal.  ENMT: Lips without lesions, good dentition, oropharynx clear.  Neck: Trachea midline, no masses, no thyromegaly. No cervical or supraclavicular lymphadenopathy  Respiratory: Unlabored respiratory effort, lungs clear to auscultation, no wheezes, no ronchi.  Cardiovascular: Normal S1, S2, no murmur, no edema.  Abdomen: Soft, non-tender, no masses, no hepatosplenomegaly.  Psych: Alert and oriented x3, normal affect and mood.        Assessment and Plan:   The following treatment plan was discussed    1. Benign essential HTN  Home bps >140/90  Inc lisinopril to 30 mg  Repeat labs.     2. Mixed hyperlipidemia  Improving  Check lfts      Followup: Return in about 6 months (around 4/12/2022).

## 2021-10-12 NOTE — ASSESSMENT & PLAN NOTE
She has some increased blood pressures to 140s and 90s at home as evidenced by her blood pressure log which she brings in with her today.  She also has loose to 116/88 systolic will increase her lisinopril slightly to 30 mg from 20.  Also she can take her lisinopril in the morning instead of at night and see if this helps.  She has no cough with the lisinopril.  He has no chest pain, no lightheadedness no leg swelling.

## 2022-04-06 ENCOUNTER — HOSPITAL ENCOUNTER (OUTPATIENT)
Dept: LAB | Facility: MEDICAL CENTER | Age: 77
End: 2022-04-06
Attending: FAMILY MEDICINE
Payer: MEDICARE

## 2022-04-06 DIAGNOSIS — I10 BENIGN ESSENTIAL HTN: ICD-10-CM

## 2022-04-06 DIAGNOSIS — E78.2 MIXED HYPERLIPIDEMIA: ICD-10-CM

## 2022-04-06 LAB
ALBUMIN SERPL BCP-MCNC: 4.4 G/DL (ref 3.2–4.9)
ALBUMIN/GLOB SERPL: 1.6 G/DL
ALP SERPL-CCNC: 88 U/L (ref 30–99)
ALT SERPL-CCNC: 21 U/L (ref 2–50)
ANION GAP SERPL CALC-SCNC: 12 MMOL/L (ref 7–16)
AST SERPL-CCNC: 26 U/L (ref 12–45)
BILIRUB SERPL-MCNC: 1 MG/DL (ref 0.1–1.5)
BUN SERPL-MCNC: 13 MG/DL (ref 8–22)
CALCIUM SERPL-MCNC: 9.7 MG/DL (ref 8.5–10.5)
CHLORIDE SERPL-SCNC: 103 MMOL/L (ref 96–112)
CHOLEST SERPL-MCNC: 189 MG/DL (ref 100–199)
CO2 SERPL-SCNC: 24 MMOL/L (ref 20–33)
CREAT SERPL-MCNC: 0.67 MG/DL (ref 0.5–1.4)
FASTING STATUS PATIENT QL REPORTED: NORMAL
GFR SERPLBLD CREATININE-BSD FMLA CKD-EPI: 91 ML/MIN/1.73 M 2
GLOBULIN SER CALC-MCNC: 2.8 G/DL (ref 1.9–3.5)
GLUCOSE SERPL-MCNC: 102 MG/DL (ref 65–99)
HDLC SERPL-MCNC: 87 MG/DL
LDLC SERPL CALC-MCNC: 91 MG/DL
POTASSIUM SERPL-SCNC: 4.5 MMOL/L (ref 3.6–5.5)
PROT SERPL-MCNC: 7.2 G/DL (ref 6–8.2)
SODIUM SERPL-SCNC: 139 MMOL/L (ref 135–145)
TRIGL SERPL-MCNC: 54 MG/DL (ref 0–149)

## 2022-04-06 PROCEDURE — 80061 LIPID PANEL: CPT

## 2022-04-06 PROCEDURE — 36415 COLL VENOUS BLD VENIPUNCTURE: CPT

## 2022-04-06 PROCEDURE — 80053 COMPREHEN METABOLIC PANEL: CPT

## 2022-04-12 ENCOUNTER — OFFICE VISIT (OUTPATIENT)
Dept: MEDICAL GROUP | Facility: PHYSICIAN GROUP | Age: 77
End: 2022-04-12
Payer: MEDICARE

## 2022-04-12 VITALS
HEIGHT: 62 IN | SYSTOLIC BLOOD PRESSURE: 138 MMHG | TEMPERATURE: 97.9 F | DIASTOLIC BLOOD PRESSURE: 90 MMHG | OXYGEN SATURATION: 97 % | BODY MASS INDEX: 38.64 KG/M2 | WEIGHT: 210 LBS | HEART RATE: 80 BPM

## 2022-04-12 DIAGNOSIS — R73.01 IMPAIRED FASTING GLUCOSE: ICD-10-CM

## 2022-04-12 DIAGNOSIS — E78.2 MIXED HYPERLIPIDEMIA: ICD-10-CM

## 2022-04-12 DIAGNOSIS — Z23 NEED FOR VACCINATION: ICD-10-CM

## 2022-04-12 DIAGNOSIS — Z12.31 ENCOUNTER FOR SCREENING MAMMOGRAM FOR BREAST CANCER: ICD-10-CM

## 2022-04-12 PROCEDURE — 99214 OFFICE O/P EST MOD 30 MIN: CPT | Mod: 25 | Performed by: FAMILY MEDICINE

## 2022-04-12 PROCEDURE — G0009 ADMIN PNEUMOCOCCAL VACCINE: HCPCS | Performed by: FAMILY MEDICINE

## 2022-04-12 PROCEDURE — 90732 PPSV23 VACC 2 YRS+ SUBQ/IM: CPT | Performed by: FAMILY MEDICINE

## 2022-04-12 RX ORDER — CLOSTRIDIUM TETANI TOXOID ANTIGEN (FORMALDEHYDE INACTIVATED), CORYNEBACTERIUM DIPHTHERIAE TOXOID ANTIGEN (FORMALDEHYDE INACTIVATED), BORDETELLA PERTUSSIS TOXOID ANTIGEN (GLUTARALDEHYDE INACTIVATED), BORDETELLA PERTUSSIS FILAMENTOUS HEMAGGLUTININ ANTIGEN (FORMALDEHYDE INACTIVATED), BORDETELLA PERTUSSIS PERTACTIN ANTIGEN, AND BORDETELLA PERTUSSIS FIMBRIAE 2/3 ANTIGEN 5; 2; 2.5; 5; 3; 5 [LF]/.5ML; [LF]/.5ML; UG/.5ML; UG/.5ML; UG/.5ML; UG/.5ML
0.5 INJECTION, SUSPENSION INTRAMUSCULAR ONCE
Qty: 0.5 ML | Refills: 0 | Status: SHIPPED
Start: 2022-04-12 | End: 2022-04-12

## 2022-04-12 ASSESSMENT — PATIENT HEALTH QUESTIONNAIRE - PHQ9: CLINICAL INTERPRETATION OF PHQ2 SCORE: 0

## 2022-04-12 NOTE — PROGRESS NOTES
"Subjective:   Rosa Riley is a 77 y.o. female here today for evaluation and management of:     Mixed hyperlipidemia  She has good improvement in LDL with atorvastatin 20 mg with no myalgia, normal LFTS  She stays active walks her dog 30 m     Pneumonia vaccination provided in clinic today.     Fasting sugar slightly elevated. Encouraged to avoid excess sweets. A1c was normal in 2020, will recheck.     Current medicines (including changes today)  Current Outpatient Medications   Medication Sig Dispense Refill   • tetanus-dipth-acell pertussis (ADACEL) 5-2-15.5 LF-MCG/0.5 Suspension Inject 0.5 mL into the shoulder, thigh, or buttocks one time for 1 dose. 0.5 mL 0   • tetanus-dipth-acell pertussis (ADACEL) 5-2-15.5 LF-MCG/0.5 Suspension Inject 0.5 mL into the shoulder, thigh, or buttocks one time for 1 dose. 0.5 mL 0   • lisinopril (PRINIVIL) 30 MG tablet Take 1 Tablet by mouth every day. 90 Tablet 3   • atorvastatin (LIPITOR) 20 MG Tab Take 1 tablet by mouth every evening. 90 tablet 3   • therapeutic multivitamin-minerals (THERAGRAN-M) Tab Take 1 Tab by mouth every day.     • Cholecalciferol 4000 units Cap Take  by mouth.     • Ascorbic Acid 500 MG Cap Take  by mouth.     • CALCIUM PO Take  by mouth.       No current facility-administered medications for this visit.     She  has a past medical history of Hyperlipidemia.    ROS  No chest pain, no shortness of breath, no abdominal pain       Objective:     /90 (BP Location: Right arm, Patient Position: Sitting, BP Cuff Size: Adult)   Pulse 80   Temp 36.6 °C (97.9 °F) (Temporal)   Ht 1.575 m (5' 2\")   Wt 95.3 kg (210 lb)   SpO2 97%  Body mass index is 38.41 kg/m².   Physical Exam:  Constitutional: Alert, no distress.  Skin: Warm, dry, good turgor, no rashes in visible areas.  Eye: Equal, round and reactive, conjunctiva clear, lids normal.  ENMT: Lips without lesions, good dentition, oropharynx clear.  Neck: Trachea midline, no masses, no thyromegaly. No " cervical or supraclavicular lymphadenopathy  Respiratory: Unlabored respiratory effort, lungs clear to auscultation, no wheezes, no ronchi.  Cardiovascular: Normal S1, S2, no murmur, no edema.  Abdomen: Soft, non-tender, no masses, no hepatosplenomegaly.  Psych: Alert and oriented x3, normal affect and mood.        Assessment and Plan:   The following treatment plan was discussed    1. Need for vaccination    - tetanus-dipth-acell pertussis (ADACEL) 5-2-15.5 LF-MCG/0.5 Suspension; Inject 0.5 mL into the shoulder, thigh, or buttocks one time for 1 dose.  Dispense: 0.5 mL; Refill: 0  - Pneumovax Vaccine (PPSV23)  - tetanus-dipth-acell pertussis (ADACEL) 5-2-15.5 LF-MCG/0.5 Suspension; Inject 0.5 mL into the shoulder, thigh, or buttocks one time for 1 dose.  Dispense: 0.5 mL; Refill: 0    2. Encounter for screening mammogram for breast cancer    - MA-SCREENING MAMMO BILAT W/CAD; Future    3. Mixed hyperlipidemia      4. Impaired fasting glucose    - HEMOGLOBIN A1C; Future      Followup: Return in about 6 months (around 10/12/2022) for review labs.

## 2022-04-12 NOTE — ASSESSMENT & PLAN NOTE
She has good improvement in LDL with atorvastatin 20 mg with no myalgia, normal LFTS  She stays active walks her dog 30 m

## 2022-06-28 RX ORDER — LISINOPRIL 30 MG/1
30 TABLET ORAL DAILY
Qty: 90 TABLET | Refills: 3 | Status: SHIPPED | OUTPATIENT
Start: 2022-06-28 | End: 2023-06-27 | Stop reason: SDUPTHER

## 2022-06-28 RX ORDER — ATORVASTATIN CALCIUM 20 MG/1
20 TABLET, FILM COATED ORAL EVERY EVENING
Qty: 90 TABLET | Refills: 3 | Status: SHIPPED | OUTPATIENT
Start: 2022-06-28 | End: 2023-07-09 | Stop reason: SDUPTHER

## 2022-06-28 NOTE — TELEPHONE ENCOUNTER
Received request via: Patient    Was the patient seen in the last year in this department? Yes    Does the patient have an active prescription (recently filled or refills available) for medication(s) requested? No     Last office Visit:04/12/2022  Last Labs:04/06/2022

## 2022-09-06 ENCOUNTER — HOSPITAL ENCOUNTER (OUTPATIENT)
Dept: RADIOLOGY | Facility: MEDICAL CENTER | Age: 77
End: 2022-09-06
Attending: FAMILY MEDICINE
Payer: MEDICARE

## 2022-09-06 DIAGNOSIS — Z12.31 VISIT FOR SCREENING MAMMOGRAM: ICD-10-CM

## 2022-09-20 ENCOUNTER — HOSPITAL ENCOUNTER (OUTPATIENT)
Dept: RADIOLOGY | Facility: MEDICAL CENTER | Age: 77
End: 2022-09-20
Attending: FAMILY MEDICINE
Payer: MEDICARE

## 2022-09-20 DIAGNOSIS — R92.8 FOLLOW-UP EXAMINATION OF ABNORMAL MAMMOGRAM: ICD-10-CM

## 2022-09-20 PROCEDURE — 76642 ULTRASOUND BREAST LIMITED: CPT | Mod: RT

## 2022-09-20 PROCEDURE — G0279 TOMOSYNTHESIS, MAMMO: HCPCS

## 2022-10-14 ENCOUNTER — HOSPITAL ENCOUNTER (OUTPATIENT)
Dept: LAB | Facility: MEDICAL CENTER | Age: 77
End: 2022-10-14
Attending: FAMILY MEDICINE
Payer: MEDICARE

## 2022-10-14 DIAGNOSIS — R73.01 IMPAIRED FASTING GLUCOSE: ICD-10-CM

## 2022-10-14 LAB
EST. AVERAGE GLUCOSE BLD GHB EST-MCNC: 114 MG/DL
HBA1C MFR BLD: 5.6 % (ref 4–5.6)

## 2022-10-14 PROCEDURE — 83036 HEMOGLOBIN GLYCOSYLATED A1C: CPT | Mod: GA

## 2022-10-14 PROCEDURE — 36415 COLL VENOUS BLD VENIPUNCTURE: CPT | Mod: GA

## 2022-10-18 ENCOUNTER — OFFICE VISIT (OUTPATIENT)
Dept: MEDICAL GROUP | Facility: PHYSICIAN GROUP | Age: 77
End: 2022-10-18
Payer: MEDICARE

## 2022-10-18 VITALS
SYSTOLIC BLOOD PRESSURE: 128 MMHG | DIASTOLIC BLOOD PRESSURE: 88 MMHG | BODY MASS INDEX: 36.86 KG/M2 | TEMPERATURE: 97.6 F | OXYGEN SATURATION: 98 % | HEIGHT: 63 IN | WEIGHT: 208 LBS | RESPIRATION RATE: 16 BRPM | HEART RATE: 71 BPM

## 2022-10-18 DIAGNOSIS — Z78.0 POSTMENOPAUSAL STATUS (AGE-RELATED) (NATURAL): ICD-10-CM

## 2022-10-18 DIAGNOSIS — Z23 NEED FOR VACCINATION: ICD-10-CM

## 2022-10-18 DIAGNOSIS — E66.01 MORBID (SEVERE) OBESITY DUE TO EXCESS CALORIES (HCC): ICD-10-CM

## 2022-10-18 DIAGNOSIS — I10 BENIGN ESSENTIAL HTN: ICD-10-CM

## 2022-10-18 DIAGNOSIS — E78.2 MIXED HYPERLIPIDEMIA: ICD-10-CM

## 2022-10-18 DIAGNOSIS — N95.1 MENOPAUSAL STATE: ICD-10-CM

## 2022-10-18 DIAGNOSIS — R73.01 IMPAIRED FASTING GLUCOSE: ICD-10-CM

## 2022-10-18 PROCEDURE — 99214 OFFICE O/P EST MOD 30 MIN: CPT | Performed by: FAMILY MEDICINE

## 2022-10-18 RX ORDER — CLOSTRIDIUM TETANI TOXOID ANTIGEN (FORMALDEHYDE INACTIVATED), CORYNEBACTERIUM DIPHTHERIAE TOXOID ANTIGEN (FORMALDEHYDE INACTIVATED), BORDETELLA PERTUSSIS TOXOID ANTIGEN (GLUTARALDEHYDE INACTIVATED), BORDETELLA PERTUSSIS FILAMENTOUS HEMAGGLUTININ ANTIGEN (FORMALDEHYDE INACTIVATED), BORDETELLA PERTUSSIS PERTACTIN ANTIGEN, AND BORDETELLA PERTUSSIS FIMBRIAE 2/3 ANTIGEN 5; 2; 2.5; 5; 3; 5 [LF]/.5ML; [LF]/.5ML; UG/.5ML; UG/.5ML; UG/.5ML; UG/.5ML
0.5 INJECTION, SUSPENSION INTRAMUSCULAR ONCE
Qty: 0.5 ML | Refills: 0 | Status: SHIPPED
Start: 2022-10-18 | End: 2022-10-18

## 2022-10-31 NOTE — PROGRESS NOTES
Subjective:   Rosa Riley is a 77 y.o. female here today for evaluation and management of:     Impaired fasting glucose  Gradual sustainable weight loss with diet changes advised  A1c ordered    Mixed hyperlipidemia  On atorvastatin 20 mg with no myalgia  Repeat cmp ordered to monitor liver function.   Fasting lipid lab before next visit.   Encouraged to continue diet changes, increase activity levels.     Benign essential HTN  Chronic HTN well controlled on lisinopril 30 mg  Has no chest pain or LE edema  Fasting labs reviewed and follow up ordered.        HCC Gap Form    Diagnosis: E66.01 - Morbid (severe) obesity due to excess calories (HCC)  Z68.36 - Body mass index (BMI) 36.0-36.9, adult  Comorbidity Diagnosis: Benign essential HTN  The current BMI is 36.85 kg/m2 as of 10/18/22 09:34 PDT  Assessment and plan: Chronic, improving. Encouraged healthy diet and physical activity changes with a goal of weight loss. Follow up at least annually. The comorbid condition is improving based on today's assessment. Continue current treatment plan. Follow up at least yearly.    Last edited 10/18/22 09:35 PDT by Quintin Jaquez M.D.             Current medicines (including changes today)  Current Outpatient Medications   Medication Sig Dispense Refill    lisinopril (PRINIVIL) 30 MG tablet Take 1 Tablet by mouth every day. 90 Tablet 3    atorvastatin (LIPITOR) 20 MG Tab Take 1 Tablet by mouth every evening. 90 Tablet 3    therapeutic multivitamin-minerals (THERAGRAN-M) Tab Take 1 Tab by mouth every day.      Cholecalciferol 4000 units Cap Take  by mouth.      Ascorbic Acid 500 MG Cap Take  by mouth.      CALCIUM PO Take  by mouth.       No current facility-administered medications for this visit.     She  has a past medical history of Hyperlipidemia.    ROS  No chest pain, no shortness of breath, no abdominal pain       Objective:     /88 (BP Location: Left arm, Patient Position: Sitting, BP Cuff Size: Adult)   Pulse  "71   Temp 36.4 °C (97.6 °F) (Temporal)   Resp 16   Ht 1.6 m (5' 3\")   Wt 94.3 kg (208 lb)   SpO2 98%  Body mass index is 36.85 kg/m².   Physical Exam:  Constitutional: Alert, no distress.  Skin: Warm, dry, good turgor, no rashes in visible areas.  Eye: Equal, round and reactive, conjunctiva clear, lids normal.  ENMT: Lips without lesions, good dentition, oropharynx clear.  Neck: Trachea midline, no masses, no thyromegaly. No cervical or supraclavicular lymphadenopathy  Respiratory: Unlabored respiratory effort, lungs clear to auscultation, no wheezes, no ronchi.  Cardiovascular: Normal S1, S2, no murmur, no edema.  Abdomen: Soft, non-tender, no masses, no hepatosplenomegaly.  Psych: Alert and oriented x3, normal affect and mood.        Assessment and Plan:   The following treatment plan was discussed    1. Postmenopausal status (age-related) (natural)  - DS-BONE DENSITY STUDY (DEXA); Future    2. Menopausal state  - DS-BONE DENSITY STUDY (DEXA); Future    3. Need for vaccination  - tetanus-dipth-acell pertussis (ADACEL) 5-2-15.5 LF-MCG/0.5 Suspension; Inject 0.5 mL into the shoulder, thigh, or buttocks one time for 1 dose.  Dispense: 0.5 mL; Refill: 0    4. Mixed hyperlipidemia  - Comp Metabolic Panel; Future  - Lipid Profile; Future    5. Impaired fasting glucose  - HEMOGLOBIN A1C; Future    6. Benign essential HTN    7. Morbid (severe) obesity due to excess calories (HCC)    8. Body mass index (BMI) 36.0-36.9, adult      Followup: Return in about 1 year (around 10/18/2023) for Hypertension.           "

## 2022-10-31 NOTE — ASSESSMENT & PLAN NOTE
On atorvastatin 20 mg with no myalgia  Repeat cmp ordered to monitor liver function.   Fasting lipid lab before next visit.   Encouraged to continue diet changes, increase activity levels.

## 2022-10-31 NOTE — ASSESSMENT & PLAN NOTE
Chronic HTN well controlled on lisinopril 30 mg  Has no chest pain or LE edema  Fasting labs reviewed and follow up ordered.

## 2023-06-26 NOTE — RESULT ENCOUNTER NOTE
[address] [facility]  Progress Note  Name: Debbi Lyles  MRN: [id number]  Unit/Bed#: [facility] 084-24 I Date of Admission: 6/14/2023   Date of Service: 6/26/2023 I Hospital Day: 12    Assessment/Plan   Fever  Assessment & Plan  Patient spiked a fever of 101.4  6/24/23. S/p  Rocephin x 7 days for urosepsis earlier in the admission. Currently afebrile  Denies abdominal pain, dyspnea, chest pain, nausea, vomiting. On exam, abdomen non tender, non distended. No obvious cellulitis noted on extremities on abdomen. CXR with mild pulmonary edema, procal and lactic acid negative  Blood culture negative 24 hours   UA negative for WBC or nitrites  Continue to monitor off abx  Had positive COVID test two months ago. So low utility of repeating as it may be false positive      Acute low back pain  Assessment & Plan  Patient reported acutely worsening low back pain that is very severe, 10/10, low back across, no radiation. No weakness of lower extremity. Xray obtained: Limited study without a true lateral image. Unchanged severe L4 compression deformity. Backspace Progressive loss of height of the L2 vertebral body may represent an acute fracture. Consider CT follow-up. CT L spine scan shows acute L1 fracture and chronic L4 fracture  Pain control with IV pain meds. Palliative care following for pain control, appreciate recommendations  Neurosx consult appreciated, no surgery at this point  LSO brace    Dysphagia  Assessment & Plan  Patient had VBS with speech   · Unfortunately he is aspiration with both solid and liquid. · change to NPO, will change PO meds to IV and other routes. · Speech recommends PEG tube. · GI recommends trial of NG tube first prior to committing to PEG tube. · Started NG tube feeding 6/22, currently at goal, tolerating. · Repeat VBS 6/26 with worsening aspiration, GI updated.  Will plan to evaluate tomorrow with likely PEG tube placement this week    Septic shock Please advise patient that I reviewed mammogram and ultrasound results. Good news: the area of concern is smaller, I've ordered the recommended follow up mammogram and ultrasound to be repeated in 6 months approximately 3/22/21  Quintin Jaquez M.D.   [facility])  Assessment & Plan  2/2 MDR E.coli urosepsis. Briefly required ICU stay and pressor support. Currently off pressors. Abx day 6, s/p 4 days of cefepime  · UCx shows Ecoli and klebsiella both susceptible to ceftriaxone  · Completed ceftriaxone for a total abx duration of 7 days. Thrombocytopenia ([address])  Assessment & Plan  Admission platelets of 418. Steady downtrend this admission, saranya at 35. One instance of melena documented on 6/16 but at the time patient had platelete count in the 120k-130k. Unclear eitiology but likely 2/2 acute sepsis vs CVVH and filter clotting. Patient did receive heparin intermittently this admission however platelets have not responded to discontinuation of heparin products. Possibly cephalosporin induced, but patient is s/p 4 days of cefepime and then transitioned to ceftriaxone. · Likely 2/2 sepsis  · No acute signs of bleeding on exam  · S/p 1 U platelets 2/18 in preparation for IR permacath placement  · Trend CBC  · Resumed Heparin SC. Continue to monitor for platelet  · uptrending platelets noted      Prolonged QT interval  Assessment & Plan  Noted on EKG. Avoid QTC prolonging medications. CHF (congestive heart failure) (HCC)  Assessment & Plan  Wt Readings from Last 3 Encounters:   06/26/23 (!) 142 kg (312 lb 13.3 oz)   06/07/23 136 kg (299 lb 13.2 oz)   06/02/23 130 kg (285 lb 8 oz)       Patient with a hx of HFpEF70%. · Patient euvolemic after CVVH  · Currently holding home torsemide in the setting of anuria. · permacath placed for intermittent HD  · D/w nephro, on IV lasix      History of ventricular tachycardia  Assessment & Plan  S/p DUAL CHAMBER ICD/ NOT MRI CONDITIONAL . 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 · Warfarin held in the setting of thrombocytopenia  · Patient currently rate controlled, continuing to hold sotalol in the setting of anuria.          Acute on chronic anemia  Assessment & Plan  Patient with chronic macrocytic anemia baseline 7-8. · Required 2U PRBC this admission  · Goal Hgb >7, replete as necessary  · EGD completed: Single small, superficial ulcer in the 1st part of the duodenum with clean base   · hgb stable 8.1, repeat pending  · Continue PPI  · Watch for s/s of GI bleed  · Resume DVT ppx with heparin and trend hgb. · Continue to hold full AC with coumadin for possible PEG placement this week    Diabetes [facility])  Assessment & Plan  Lab Results   Component Value Date    HGBA1C 5.0 06/15/2023       Recent Labs     06/25/23  1730 06/26/23  0002 06/26/23  0602 06/26/23  1143   POCGLU 162* 156* 164* 161*       Blood Sugar Average: Last 72 hrs:  (P) 059.2208594678517132   Lab Results   Component Value Date    HGBA1C 5.0 06/15/2023       Recent Labs     06/25/23  1730 06/26/23  0002 06/26/23  0602 06/26/23  1143   POCGLU 162* 156* 164* 161*       Blood Sugar Average: Last 72 hrs:  (P) 753.8038655125557503     Plan:  · Home regimen of metformin held in the setting of REMA and metabolic acidosis. · Continue with SSI QID       * REMA (acute kidney injury) [facility])  Assessment & Plan  Patient downgraded from critical care, admitted for septic shock secondary to UTI leading to REMA requiring dialysis. Cr improving  · Likely UTI was the initial cause of REMA, exacerbated by concurrent metformin and sotolol use. · Combs currently in place as pt noted to have urinary incontinence w/ skin breakdown, consider d/c tomorrow   · monitoring bladder with q6h bladder scans  · CVVH discontinued 6/19  · tunneled dialysis catheter placed on 6/19, on intermittent hemodialysis  · Nephrology following, continue with IV lasix 40 mg daily   · Nephrology to re-evaluate tomorrow, if cr continues to improve likely remove dialysis cath             VTE Pharmacologic Prophylaxis:   Moderate Risk (Score 3-4) - Pharmacological DVT Prophylaxis Ordered: heparin. Patient Centered Rounds:  I performed bedside rounds with nursing staff today. Discussions with Specialists or Other Care Team Provider: palliative    Education and Discussions with Family / Patient: Updated  (daughter) at bedside. Total Time Spent on Date of Encounter in care of patient: 55 minutes This time was spent on one or more of the following: performing physical exam; counseling and coordination of care; obtaining or reviewing history; documenting in the medical record; reviewing/ordering tests, medications or procedures; communicating with other healthcare professionals and discussing with patient's family/caregivers. Current Length of Stay: 12 day(s)  Current Patient Status: Inpatient   Certification Statement: The patient will continue to require additional inpatient hospital stay due to PEG tube evaluation  Discharge Plan: Anticipate discharge in >72 hrs to discharge location to be determined pending rehab evaluations. Code Status: Level 1 - Full Code    Subjective:   Pt seen and examined at bedside. Reports worsening back pain today after moving. Denies nausea, vomiting, diarrhea, constipation, sob or chest pain. Objective:     Vitals:   Temp (24hrs), Av.8 °F (36.6 °C), Min:97.5 °F (36.4 °C), Max:98 °F (36.7 °C)    Temp:  [97.5 °F (36.4 °C)-98 °F (36.7 °C)] 98 °F (36.7 °C)  HR:  [] 117  Resp:  [16-20] 16  BP: ()/(46-63) 96/46  SpO2:  [93 %-97 %] 95 %  Body mass index is 41.27 kg/m². Input and Output Summary (last 24 hours): Intake/Output Summary (Last 24 hours) at 2023 1624  Last data filed at 2023 1321  Gross per 24 hour   Intake 1403 ml   Output 700 ml   Net 703 ml       Physical Exam:   Physical Exam  Vitals reviewed. Constitutional:       General: He is not in acute distress. Appearance: He is well-developed. He is obese. He is not diaphoretic. HENT:      Head: Normocephalic and atraumatic. Mouth/Throat:      Pharynx: No oropharyngeal exudate.    Eyes: General: No scleral icterus. Extraocular Movements: Extraocular movements intact. Conjunctiva/sclera: Conjunctivae normal.   Neck:      Vascular: No JVD. Trachea: No tracheal deviation. Cardiovascular:      Rate and Rhythm: Regular rhythm. Tachycardia present. Heart sounds: No murmur heard. No friction rub. No gallop. Pulmonary:      Effort: Pulmonary effort is normal. No respiratory distress. Breath sounds: No stridor. No wheezing. Abdominal:      General: There is no distension. Palpations: Abdomen is soft. There is no mass. Tenderness: There is no abdominal tenderness. There is no right CVA tenderness or left CVA tenderness. Musculoskeletal:         General: No tenderness. Right lower leg: Edema present. Left lower leg: Edema present. Comments: Generalized weakness   Skin:     General: Skin is warm. Coloration: Skin is pale. Findings: No erythema. Neurological:      Comments: Oriented to person and place         Additional Data:     Labs:  Results from last 7 days   Lab Units 06/26/23  0445 06/21/23  0517 06/20/23  0455   WBC Thousand/uL 11.19*   < > 10.96*   HEMOGLOBIN g/dL 8.4*   < > 7.0*   HEMATOCRIT % 28.9*   < > 22.9*   PLATELETS Thousands/uL 184   < > 67*   NEUTROS PCT %  --   --  74   LYMPHS PCT %  --   --  13*   MONOS PCT %  --   --  9   EOS PCT %  --   --  2    < > = values in this interval not displayed.      Results from last 7 days   Lab Units 06/26/23  0445   SODIUM mmol/L 142   POTASSIUM mmol/L 4.3   CHLORIDE mmol/L 113*   CO2 mmol/L 28   BUN mg/dL 27*   CREATININE mg/dL 1.45*   ANION GAP mmol/L 1   CALCIUM mg/dL 7.6*   ALBUMIN g/dL 1.8*   GLUCOSE RANDOM mg/dL 137     Results from last 7 days   Lab Units 06/22/23  0355   INR  1.74*     Results from last 7 days   Lab Units 06/26/23  1143 06/26/23  0602 06/26/23  0002 06/25/23  1730 06/25/23  1151 06/25/23  0646 06/25/23  0505 06/24/23  2355 06/24/23  1750 06/24/23  1136 06/24/23  0618 06/23/23  2332   POC GLUCOSE mg/dl 161* 164* 156* 162* 179* 187* 162* 175* 197* 185* 194* 172*         Results from last 7 days   Lab Units 06/25/23  0555 06/24/23  1404   LACTIC ACID mmol/L  --  1.9   PROCALCITONIN ng/ml 0.20 0.19       Lines/Drains:  Invasive Devices     Peripheral Intravenous Line  Duration           Peripheral IV 06/26/23 Dorsal (posterior); Left Forearm <1 day          Hemodialysis Catheter  Duration           HD Permanent Double Catheter 6 days          Drain  Duration           NG/OG Tube Nasogastric Left nare 4 days    Urethral Catheter Non-latex 16 Fr. 1 day              Urinary Catheter:  Goal for removal: Voiding trial when ambulation improves               Imaging: No pertinent imaging reviewed. Recent Cultures (last 7 days):   Results from last 7 days   Lab Units 06/24/23  1405   BLOOD CULTURE  No Growth at 24 hrs. No Growth at 24 hrs.        Last 24 Hours Medication List:   Current Facility-Administered Medications   Medication Dose Route Frequency Provider Last Rate   • acetaminophen  650 mg Per NG Tube Q6H Madelyn Payne DO     • bisacodyl  10 mg Rectal Daily Emeterio Cifuentes MD     • diphenhydramine, lidocaine, Al/Mg hydroxide, simethicone  10 mL Swish & Spit Q6H PRN Fozia Bautista MD     • [START ON 6/27/2023] furosemide  40 mg Intravenous Daily Luis Felipe Castillo MD     • heparin (porcine)  5,000 Units Subcutaneous Q8H [address] Marybeth White MD     • HYDROmorphone  0.2 mg Intravenous Q4H PRN Kaden Felipe DO      Or   • HYDROmorphone  0.5 mg Intravenous Q4H PRN Kaden Felipe, DO     • HYDROmorphone  1 mg Intravenous Q4H PRN Kaden Felipe, DO     • insulin lispro  2-12 Units Subcutaneous Q6H Angelia Nielsen CRNP     • lidocaine  1 patch Topical Daily Fozia Bautista MD     • lidocaine   Topical 4x Daily PRN Fozia Bautista MD     • melatonin  6 mg Oral HS Angelia Em, CRNP     • naloxone  0.04 mg Intravenous Q1MIN PRN Kaden Felipe DO     • nystatin   Topical BID Lashae Tessa Paredes DO     • pantoprazole  40 mg Intravenous Q24H [address], MD     • trimethobenzamide  200 mg Intramuscular Q6H PRN Huong Bautista MD       Facility-Administered Medications Ordered in Other Encounters   Medication Dose Route Frequency Provider Last Rate   • sodium chloride  75 mL/hr Intravenous Continuous Moon Davis MD Stopped (05/26/23 5821)        Today, Patient Was Seen By: Adriana Owens DO    **Please Note: This note may have been constructed using a voice recognition system. **

## 2023-06-27 RX ORDER — LISINOPRIL 30 MG/1
30 TABLET ORAL DAILY
Qty: 90 TABLET | Refills: 3 | Status: SHIPPED | OUTPATIENT
Start: 2023-06-27

## 2023-06-27 NOTE — TELEPHONE ENCOUNTER
Received request via: Patient    Was the patient seen in the last year in this department? Yes    Does the patient have an active prescription (recently filled or refills available) for medication(s) requested? No    Does the patient have [facility] and need 100 day supply (blood pressure, diabetes and cholesterol meds only)? Patient does not have SCP      Last Office Visit;10/18/2022  Last Labs:10/14/2022

## 2023-07-10 NOTE — TELEPHONE ENCOUNTER
Last ov 10/18/22    Received request via: Patient    Was the patient seen in the last year in this department? Yes    Does the patient have an active prescription (recently filled or refills available) for medication(s) requested? No    Does the patient have USP Plus and need 100 day supply (blood pressure, diabetes and cholesterol meds only)? Patient does not have SCP

## 2023-07-12 RX ORDER — ATORVASTATIN CALCIUM 20 MG/1
20 TABLET, FILM COATED ORAL EVERY EVENING
Qty: 90 TABLET | Refills: 3 | Status: SHIPPED | OUTPATIENT
Start: 2023-07-12

## 2023-08-31 ENCOUNTER — HOSPITAL ENCOUNTER (OUTPATIENT)
Dept: LAB | Facility: MEDICAL CENTER | Age: 78
End: 2023-08-31
Attending: FAMILY MEDICINE
Payer: MEDICARE

## 2023-08-31 DIAGNOSIS — R73.01 IMPAIRED FASTING GLUCOSE: ICD-10-CM

## 2023-08-31 DIAGNOSIS — E78.2 MIXED HYPERLIPIDEMIA: ICD-10-CM

## 2023-08-31 LAB
ALBUMIN SERPL BCP-MCNC: 4.4 G/DL (ref 3.2–4.9)
ALBUMIN/GLOB SERPL: 1.7 G/DL
ALP SERPL-CCNC: 87 U/L (ref 30–99)
ALT SERPL-CCNC: 20 U/L (ref 2–50)
ANION GAP SERPL CALC-SCNC: 8 MMOL/L (ref 7–16)
AST SERPL-CCNC: 27 U/L (ref 12–45)
BILIRUB SERPL-MCNC: 1.1 MG/DL (ref 0.1–1.5)
BUN SERPL-MCNC: 14 MG/DL (ref 8–22)
CALCIUM ALBUM COR SERPL-MCNC: 9.4 MG/DL (ref 8.5–10.5)
CALCIUM SERPL-MCNC: 9.7 MG/DL (ref 8.5–10.5)
CHLORIDE SERPL-SCNC: 104 MMOL/L (ref 96–112)
CHOLEST SERPL-MCNC: 182 MG/DL (ref 100–199)
CO2 SERPL-SCNC: 27 MMOL/L (ref 20–33)
CREAT SERPL-MCNC: 0.66 MG/DL (ref 0.5–1.4)
EST. AVERAGE GLUCOSE BLD GHB EST-MCNC: 117 MG/DL
FASTING STATUS PATIENT QL REPORTED: NORMAL
GFR SERPLBLD CREATININE-BSD FMLA CKD-EPI: 91 ML/MIN/1.73 M 2
GLOBULIN SER CALC-MCNC: 2.6 G/DL (ref 1.9–3.5)
GLUCOSE SERPL-MCNC: 107 MG/DL (ref 65–99)
HBA1C MFR BLD: 5.7 % (ref 4–5.6)
HDLC SERPL-MCNC: 83 MG/DL
LDLC SERPL CALC-MCNC: 88 MG/DL
POTASSIUM SERPL-SCNC: 4.4 MMOL/L (ref 3.6–5.5)
PROT SERPL-MCNC: 7 G/DL (ref 6–8.2)
SODIUM SERPL-SCNC: 139 MMOL/L (ref 135–145)
TRIGL SERPL-MCNC: 55 MG/DL (ref 0–149)

## 2023-08-31 PROCEDURE — 36415 COLL VENOUS BLD VENIPUNCTURE: CPT

## 2023-08-31 PROCEDURE — 80061 LIPID PANEL: CPT

## 2023-08-31 PROCEDURE — 83036 HEMOGLOBIN GLYCOSYLATED A1C: CPT | Mod: GA

## 2023-08-31 PROCEDURE — 80053 COMPREHEN METABOLIC PANEL: CPT

## 2023-09-07 ENCOUNTER — HOSPITAL ENCOUNTER (OUTPATIENT)
Dept: RADIOLOGY | Facility: MEDICAL CENTER | Age: 78
End: 2023-09-07
Attending: FAMILY MEDICINE
Payer: MEDICARE

## 2023-09-07 DIAGNOSIS — Z12.31 VISIT FOR SCREENING MAMMOGRAM: ICD-10-CM

## 2023-09-07 PROCEDURE — 77063 BREAST TOMOSYNTHESIS BI: CPT

## 2023-09-12 SDOH — ECONOMIC STABILITY: HOUSING INSECURITY: IN THE LAST 12 MONTHS, HOW MANY PLACES HAVE YOU LIVED?: 1

## 2023-09-12 SDOH — ECONOMIC STABILITY: FOOD INSECURITY: WITHIN THE PAST 12 MONTHS, YOU WORRIED THAT YOUR FOOD WOULD RUN OUT BEFORE YOU GOT MONEY TO BUY MORE.: NEVER TRUE

## 2023-09-12 SDOH — ECONOMIC STABILITY: INCOME INSECURITY: HOW HARD IS IT FOR YOU TO PAY FOR THE VERY BASICS LIKE FOOD, HOUSING, MEDICAL CARE, AND HEATING?: NOT HARD AT ALL

## 2023-09-12 SDOH — HEALTH STABILITY: PHYSICAL HEALTH: ON AVERAGE, HOW MANY DAYS PER WEEK DO YOU ENGAGE IN MODERATE TO STRENUOUS EXERCISE (LIKE A BRISK WALK)?: 7 DAYS

## 2023-09-12 SDOH — ECONOMIC STABILITY: INCOME INSECURITY: IN THE LAST 12 MONTHS, WAS THERE A TIME WHEN YOU WERE NOT ABLE TO PAY THE MORTGAGE OR RENT ON TIME?: NO

## 2023-09-12 SDOH — ECONOMIC STABILITY: FOOD INSECURITY: WITHIN THE PAST 12 MONTHS, THE FOOD YOU BOUGHT JUST DIDN'T LAST AND YOU DIDN'T HAVE MONEY TO GET MORE.: NEVER TRUE

## 2023-09-12 SDOH — HEALTH STABILITY: PHYSICAL HEALTH: ON AVERAGE, HOW MANY MINUTES DO YOU ENGAGE IN EXERCISE AT THIS LEVEL?: 40 MIN

## 2023-09-12 ASSESSMENT — LIFESTYLE VARIABLES
AUDIT-C TOTAL SCORE: 4
HOW OFTEN DO YOU HAVE A DRINK CONTAINING ALCOHOL: 4 OR MORE TIMES A WEEK
SKIP TO QUESTIONS 9-10: 1
HOW OFTEN DO YOU HAVE SIX OR MORE DRINKS ON ONE OCCASION: NEVER
HOW MANY STANDARD DRINKS CONTAINING ALCOHOL DO YOU HAVE ON A TYPICAL DAY: 1 OR 2

## 2023-09-12 ASSESSMENT — SOCIAL DETERMINANTS OF HEALTH (SDOH)
HOW MANY DRINKS CONTAINING ALCOHOL DO YOU HAVE ON A TYPICAL DAY WHEN YOU ARE DRINKING: 1 OR 2
HOW OFTEN DO YOU ATTEND CHURCH OR RELIGIOUS SERVICES?: NEVER
DO YOU BELONG TO ANY CLUBS OR ORGANIZATIONS SUCH AS CHURCH GROUPS UNIONS, FRATERNAL OR ATHLETIC GROUPS, OR SCHOOL GROUPS?: NO
HOW OFTEN DO YOU ATTEND CHURCH OR RELIGIOUS SERVICES?: NEVER
HOW OFTEN DO YOU GET TOGETHER WITH FRIENDS OR RELATIVES?: MORE THAN THREE TIMES A WEEK
HOW OFTEN DO YOU HAVE A DRINK CONTAINING ALCOHOL: 4 OR MORE TIMES A WEEK
HOW OFTEN DO YOU HAVE SIX OR MORE DRINKS ON ONE OCCASION: NEVER
IN A TYPICAL WEEK, HOW MANY TIMES DO YOU TALK ON THE PHONE WITH FAMILY, FRIENDS, OR NEIGHBORS?: MORE THAN THREE TIMES A WEEK
DO YOU BELONG TO ANY CLUBS OR ORGANIZATIONS SUCH AS CHURCH GROUPS UNIONS, FRATERNAL OR ATHLETIC GROUPS, OR SCHOOL GROUPS?: NO
HOW OFTEN DO YOU ATTENT MEETINGS OF THE CLUB OR ORGANIZATION YOU BELONG TO?: NEVER
HOW HARD IS IT FOR YOU TO PAY FOR THE VERY BASICS LIKE FOOD, HOUSING, MEDICAL CARE, AND HEATING?: NOT HARD AT ALL
HOW OFTEN DO YOU GET TOGETHER WITH FRIENDS OR RELATIVES?: MORE THAN THREE TIMES A WEEK
WITHIN THE PAST 12 MONTHS, YOU WORRIED THAT YOUR FOOD WOULD RUN OUT BEFORE YOU GOT THE MONEY TO BUY MORE: NEVER TRUE
HOW OFTEN DO YOU ATTENT MEETINGS OF THE CLUB OR ORGANIZATION YOU BELONG TO?: NEVER
IN A TYPICAL WEEK, HOW MANY TIMES DO YOU TALK ON THE PHONE WITH FAMILY, FRIENDS, OR NEIGHBORS?: MORE THAN THREE TIMES A WEEK

## 2023-09-15 ENCOUNTER — OFFICE VISIT (OUTPATIENT)
Dept: MEDICAL GROUP | Facility: PHYSICIAN GROUP | Age: 78
End: 2023-09-15
Payer: MEDICARE

## 2023-09-15 VITALS
WEIGHT: 210 LBS | RESPIRATION RATE: 16 BRPM | TEMPERATURE: 96.9 F | HEART RATE: 69 BPM | OXYGEN SATURATION: 97 % | DIASTOLIC BLOOD PRESSURE: 84 MMHG | BODY MASS INDEX: 37.21 KG/M2 | HEIGHT: 63 IN | SYSTOLIC BLOOD PRESSURE: 124 MMHG

## 2023-09-15 DIAGNOSIS — R73.01 IMPAIRED FASTING GLUCOSE: ICD-10-CM

## 2023-09-15 DIAGNOSIS — E78.2 MIXED HYPERLIPIDEMIA: ICD-10-CM

## 2023-09-15 DIAGNOSIS — E66.01 MORBID (SEVERE) OBESITY DUE TO EXCESS CALORIES (HCC): ICD-10-CM

## 2023-09-15 DIAGNOSIS — Z23 NEED FOR VACCINATION: ICD-10-CM

## 2023-09-15 DIAGNOSIS — E55.9 VITAMIN D DEFICIENCY: ICD-10-CM

## 2023-09-15 DIAGNOSIS — I10 BENIGN ESSENTIAL HTN: ICD-10-CM

## 2023-09-15 PROBLEM — E66.9 OBESITY (BMI 35.0-39.9 WITHOUT COMORBIDITY): Status: RESOLVED | Noted: 2018-10-31 | Resolved: 2023-09-15

## 2023-09-15 PROCEDURE — 90677 PCV20 VACCINE IM: CPT | Performed by: FAMILY MEDICINE

## 2023-09-15 PROCEDURE — 3074F SYST BP LT 130 MM HG: CPT | Performed by: FAMILY MEDICINE

## 2023-09-15 PROCEDURE — G0009 ADMIN PNEUMOCOCCAL VACCINE: HCPCS | Performed by: FAMILY MEDICINE

## 2023-09-15 PROCEDURE — G0439 PPPS, SUBSEQ VISIT: HCPCS | Mod: 25 | Performed by: FAMILY MEDICINE

## 2023-09-15 PROCEDURE — 3079F DIAST BP 80-89 MM HG: CPT | Performed by: FAMILY MEDICINE

## 2023-09-15 ASSESSMENT — ACTIVITIES OF DAILY LIVING (ADL): BATHING_REQUIRES_ASSISTANCE: 0

## 2023-09-15 ASSESSMENT — PATIENT HEALTH QUESTIONNAIRE - PHQ9: CLINICAL INTERPRETATION OF PHQ2 SCORE: 0

## 2023-09-15 ASSESSMENT — ENCOUNTER SYMPTOMS: GENERAL WELL-BEING: EXCELLENT

## 2023-09-15 NOTE — ASSESSMENT & PLAN NOTE
Latest Reference Range & Units 08/31/23 08:36   Cholesterol,Tot 100 - 199 mg/dL 182   Triglycerides 0 - 149 mg/dL 55   HDL >=40 mg/dL 83   LDL <100 mg/dL 88   Well controlled on atorvastatin 20 mg no mayalgia, normal lfts  Repeat cmp, lipids annually

## 2023-09-15 NOTE — PROGRESS NOTES
Chief Complaint   Patient presents with    Annual Exam       HPI:  Rosa is a 78 y.o. here for Medicare Annual Wellness Visit      Patient Active Problem List    Diagnosis Date Noted    Benign essential HTN 07/21/2020    Pneumococcal vaccination declined 01/10/2020    Mixed hyperlipidemia 04/06/2015    Impaired fasting glucose 04/06/2015    Morbid (severe) obesity due to excess calories (HCC)        Current Outpatient Medications   Medication Sig Dispense Refill    atorvastatin (LIPITOR) 20 MG Tab Take 1 Tablet by mouth every evening. 90 Tablet 3    lisinopril (PRINIVIL) 30 MG tablet Take 1 Tablet by mouth every day. 90 Tablet 3    therapeutic multivitamin-minerals (THERAGRAN-M) Tab Take 1 Tab by mouth every day.      Cholecalciferol 4000 units Cap Take  by mouth.      Ascorbic Acid 500 MG Cap Take  by mouth.      CALCIUM PO Take  by mouth.       No current facility-administered medications for this visit.        Patient is taking medications as noted in medication list.  Current supplements as per medication list.     Allergies: Patient has no known allergies.    Current social contact/activities: walking with dogs, gets together with family, friends    Is patient current with immunizations? Yes. PCV 20 done today    She  reports that she quit smoking about 27 years ago. She started smoking about 57 years ago. She has a 29.1 pack-year smoking history. She has never used smokeless tobacco. She reports current alcohol use. She reports that she does not use drugs.  Counseling given: Not Answered      ROS:    Gait: Uses no assistive device no  Ostomy: No   Other tubes: No   Amputations: No   Chronic oxygen use No   Last eye exam June 2023   Wears hearing aids: No   : Reports urinary leakage during the last 6 months that has not interfered at all with their daily activities or sleep.    Screening:    Depression Screening  Little interest or pleasure in doing things?  0 - not at all  Feeling down, depressed, or  hopeless? 0 - not at all  Patient Health Questionnaire Score: 0    If depressive symptoms identified deferred to follow up visit unless specifically addressed in assessment and plan.    Interpretation of PHQ-9 Total Score   Score Severity   1-4 No Depression   5-9 Mild Depression   10-14 Moderate Depression   15-19 Moderately Severe Depression   20-27 Severe Depression    Screening for Cognitive Impairment  Three Minute Recall (daughter, heaven, mountain)  2/3    John clock face with all 12 numbers and set the hands to show 10 past 11.  Yes    If cognitive concerns identified, deferred for follow up unless specifically addressed in assessment and plan.    Fall Risk Assessment  Has the patient had two or more falls in the last year or any fall with injury in the last year?  No  If fall risk identified, deferred for follow up unless specifically addressed in assessment and plan.    Safety Assessment  Throw rugs on floor.     Handrails on all stairs.     Good lighting in all hallways.     Difficulty hearing.  No  Patient counseled about all safety risks that were identified.    Functional Assessment ADLs  Are there any barriers preventing you from cooking for yourself or meeting nutritional needs?  No.    Are there any barriers preventing you from driving safely or obtaining transportation?  No.    Are there any barriers preventing you from using a telephone or calling for help?  No.    Are there any barriers preventing you from shopping?  No.    Are there any barriers preventing you from taking care of your own finances?  No.    Are there any barriers preventing you from managing your medications?  No.    Are there any barriers preventing you from showering, bathing or dressing yourself?  No.    Are you currently engaging in any exercise or physical activity?  Yes.     What is your perception of your health?  Excellent.    Advance Care Planning  Do you have an Advance Directive, Living Will, Durable Power of ,  or POLST? Yes  Advance Directive Living Will     is on file    Health Maintenance Summary            Ordered - Bone Density Scan (Every 5 Years) Ordered on 10/18/2022      No completion history exists for this topic.              Overdue - COVID-19 Vaccine (2 - Pfizer series) Overdue since 2/3/2023      10/03/2022  Imm Admin: MODERNA BIVALENT BOOSTER SARS-COV-2 VACCINE (6+)    10/27/2021  Imm Admin: MODERNA SARS-COV-2 VACCINE (12+)    02/18/2021  Imm Admin: MODERNA SARS-COV-2 VACCINE (12+)    01/20/2021  Imm Admin: MODERNA SARS-COV-2 VACCINE (12+)              Overdue - Influenza Vaccine (1) Overdue since 9/1/2023      10/03/2022  Imm Admin: Influenza Vaccine Adult HD    09/29/2021  Imm Admin: Influenza Vaccine Adult HD    10/07/2020  Imm Admin: Influenza Vaccine Adult HD              Postponed - IMM DTaP/Tdap/Td Vaccine (1 - Tdap) Postponed until 9/15/2024      No completion history exists for this topic.              Annual Wellness Visit (Every 366 Days) Next due on 9/15/2024      09/15/2023  Subsequent Annual Wellness Visit - Includes PPPS ()    07/13/2021  Subsequent Annual Wellness Visit - Includes PPPS ()    01/10/2020  Subsequent Annual Wellness Visit - Includes PPPS ()              Pneumococcal Vaccine: 65+ Years (Series Information) Completed      09/15/2023  Imm Admin: Pneumococcal Conjugate Vaccine (PCV20)    04/12/2022  Imm Admin: Pneumococcal polysaccharide vaccine (PPSV-23)              Hepatitis A Vaccine (Hep A) (Series Information) Aged Out      No completion history exists for this topic.              Hepatitis B Vaccine (Hep B) (Series Information) Aged Out      No completion history exists for this topic.              HPV Vaccines (Series Information) Aged Out      No completion history exists for this topic.              Polio Vaccine (Inactivated Polio) (Series Information) Aged Out      No completion history exists for this topic.              Meningococcal Immunization  (Series Information) Aged Out      No completion history exists for this topic.              Discontinued - Mammogram  Discontinued        Frequency changed to Never automatically (Topic No Longer Applies)    2023  MA-SCREENING MAMMO BILAT W/TOMOSYNTHESIS W/CAD    2022  MA-DIAGNOSTIC MAMMO BILAT W/TOMOSYNTHESIS W/CAD    2021  MA-DIAGNOSTIC MAMMO RIGHT W/TOMOSYNTHESIS W/CAD    2020  MA-DIAGNOSTIC MAMMO RIGHT W/TOMOSYNTHESIS W/O CAD    Only the first 5 history entries have been loaded, but more history exists.              Discontinued - Colorectal Cancer Screening  Discontinued        Frequency changed to Never automatically (Topic No Longer Applies)    2021  COLOGUARD COLON CANCER SCREENING    2018  OCCULT BLOOD FECES IMMUNOASSAY (FIT)    2006  Colonoscopy (Previously completed)              Discontinued - Zoster (Shingles) Vaccines  Discontinued      No completion history exists for this topic.              Discontinued - Hepatitis C Screening  Discontinued      No completion history exists for this topic.                    Patient Care Team:  Quintin Jaquez M.D. as PCP - General (Family Medicine)    Social History     Tobacco Use    Smoking status: Former     Current packs/day: 0.00     Average packs/day: 1 pack/day for 29.1 years (29.1 ttl pk-yrs)     Types: Cigarettes     Start date: 10/1/1966     Quit date: 1995     Years since quittin.8    Smokeless tobacco: Never   Vaping Use    Vaping Use: Never used   Substance Use Topics    Alcohol use: Yes     Alcohol/week: 0.0 oz     Comment: one glass of wine daily    Drug use: No     Family History   Problem Relation Age of Onset    Arthritis Maternal Grandmother     Diabetes Maternal Grandmother      She  has a past medical history of Hyperlipidemia.   Past Surgical History:   Procedure Laterality Date    DILATION AND CURETTAGE      ORIF, FOOT      TONSILLECTOMY         Exam:   /84   Pulse 69   Temp 36.1  "°C (96.9 °F) (Temporal)   Resp 16   Ht 1.6 m (5' 3\")   Wt 95.3 kg (210 lb)   SpO2 97%  Body mass index is 37.2 kg/m².    Hearing excellent.    Dentition good  Alert, oriented in no acute distress.  Eye contact is good, speech goal directed, affect calm      Assessment and Plan. The following treatment and monitoring plan is recommended:   1. Morbid (severe) obesity due to excess calories (HCC)  - Subsequent Annual Wellness Visit - Includes PPPS ()    2. Mixed hyperlipidemia  - Lipid Profile; Future  - Comp Metabolic Panel; Future  - Subsequent Annual Wellness Visit - Includes PPPS ()    3. Impaired fasting glucose  - HEMOGLOBIN A1C; Future  - Subsequent Annual Wellness Visit - Includes PPPS ()    4. Benign essential HTN  - Subsequent Annual Wellness Visit - Includes PPPS ()    5. Need for vaccination  - Pneumococcal Conjugate Vaccine 20-Valent (19 yrs+)  - Subsequent Annual Wellness Visit - Includes PPPS ()    6. Vitamin D deficiency  - VITAMIN D,25 HYDROXY (DEFICIENCY); Future  - Subsequent Annual Wellness Visit - Includes PPPS ()       Services suggested: No services needed at this time  Health Care Screening recommendations as per orders if indicated.  Referrals offered: PT/OT/Nutrition counseling/Behavioral Health/Smoking cessation as per orders if indicated.    Discussion today about general wellness and lifestyle habits:    Prevent falls and reduce trip hazards; Cautioned about securing or removing rugs.  Have a working fire alarm and carbon monoxide detector;   Engage in regular physical activity and social activities     Follow-up: Return in about 1 year (around 9/15/2024) for annual.  "

## 2023-09-15 NOTE — ASSESSMENT & PLAN NOTE
Well controlled on lisinopril 30 mg with no cough.   She has no chest pain, LE edema  Walks her two dogs about a mile or more a day.

## 2024-06-06 RX ORDER — LISINOPRIL 30 MG/1
30 TABLET ORAL DAILY
Qty: 90 TABLET | Refills: 3 | Status: SHIPPED | OUTPATIENT
Start: 2024-06-06

## 2024-06-06 NOTE — TELEPHONE ENCOUNTER
Requested Prescriptions     Pending Prescriptions Disp Refills    lisinopril (PRINIVIL) 30 MG tablet 90 Tablet 3     Sig: Take 1 Tablet by mouth every day.      Last office visit: 9/15/23  Last lab: 8/31/23

## 2024-07-02 RX ORDER — ATORVASTATIN CALCIUM 20 MG/1
20 TABLET, FILM COATED ORAL EVERY EVENING
Qty: 90 TABLET | Refills: 3 | Status: SHIPPED | OUTPATIENT
Start: 2024-07-02

## 2024-10-10 ENCOUNTER — HOSPITAL ENCOUNTER (OUTPATIENT)
Dept: RADIOLOGY | Facility: MEDICAL CENTER | Age: 79
End: 2024-10-10
Attending: FAMILY MEDICINE
Payer: MEDICARE

## 2024-10-10 DIAGNOSIS — Z12.31 VISIT FOR SCREENING MAMMOGRAM: ICD-10-CM

## 2024-10-10 PROCEDURE — 77067 SCR MAMMO BI INCL CAD: CPT

## 2025-02-11 ENCOUNTER — APPOINTMENT (OUTPATIENT)
Dept: MEDICAL GROUP | Facility: PHYSICIAN GROUP | Age: 80
End: 2025-02-11
Payer: MEDICARE

## 2025-05-06 DIAGNOSIS — R73.01 IMPAIRED FASTING GLUCOSE: ICD-10-CM

## 2025-05-06 DIAGNOSIS — R79.89 ABNORMAL TSH: ICD-10-CM

## 2025-05-06 DIAGNOSIS — R79.89 ABNORMAL CBC: ICD-10-CM

## 2025-05-06 DIAGNOSIS — E78.2 MIXED HYPERLIPIDEMIA: ICD-10-CM

## 2025-05-06 DIAGNOSIS — E55.9 VITAMIN D DEFICIENCY: ICD-10-CM

## 2025-05-15 NOTE — ASSESSMENT & PLAN NOTE
She runs 130/70s for her BPs at home. She notes MA cranks up the BP cuff too tight and is causes her pain and she gets angry.   She will send me a BP from home via [employer] today. In clinic BP today is 200/114   Medication teaching and assessment/Observation and assessment/Rehabilitation services/Teaching and training

## 2025-05-21 ENCOUNTER — HOSPITAL ENCOUNTER (OUTPATIENT)
Dept: LAB | Facility: MEDICAL CENTER | Age: 80
End: 2025-05-21
Attending: FAMILY MEDICINE
Payer: MEDICARE

## 2025-05-21 DIAGNOSIS — E55.9 VITAMIN D DEFICIENCY: ICD-10-CM

## 2025-05-21 DIAGNOSIS — R79.89 ABNORMAL CBC: ICD-10-CM

## 2025-05-21 DIAGNOSIS — E78.2 MIXED HYPERLIPIDEMIA: ICD-10-CM

## 2025-05-21 DIAGNOSIS — R73.01 IMPAIRED FASTING GLUCOSE: ICD-10-CM

## 2025-05-21 DIAGNOSIS — R79.89 ABNORMAL TSH: ICD-10-CM

## 2025-05-21 LAB
BASOPHILS # BLD AUTO: 0.6 % (ref 0–1.8)
BASOPHILS # BLD: 0.05 K/UL (ref 0–0.12)
EOSINOPHIL # BLD AUTO: 0.05 K/UL (ref 0–0.51)
EOSINOPHIL NFR BLD: 0.6 % (ref 0–6.9)
ERYTHROCYTE [DISTWIDTH] IN BLOOD BY AUTOMATED COUNT: 45.1 FL (ref 35.9–50)
HCT VFR BLD AUTO: 48.1 % (ref 37–47)
HGB BLD-MCNC: 15.4 G/DL (ref 12–16)
IMM GRANULOCYTES # BLD AUTO: 0.03 K/UL (ref 0–0.11)
IMM GRANULOCYTES NFR BLD AUTO: 0.3 % (ref 0–0.9)
LYMPHOCYTES # BLD AUTO: 3.01 K/UL (ref 1–4.8)
LYMPHOCYTES NFR BLD: 33.3 % (ref 22–41)
MCH RBC QN AUTO: 27.9 PG (ref 27–33)
MCHC RBC AUTO-ENTMCNC: 32 G/DL (ref 32.2–35.5)
MCV RBC AUTO: 87.3 FL (ref 81.4–97.8)
MONOCYTES # BLD AUTO: 0.56 K/UL (ref 0–0.85)
MONOCYTES NFR BLD AUTO: 6.2 % (ref 0–13.4)
NEUTROPHILS # BLD AUTO: 5.34 K/UL (ref 1.82–7.42)
NEUTROPHILS NFR BLD: 59 % (ref 44–72)
NRBC # BLD AUTO: 0 K/UL
NRBC BLD-RTO: 0 /100 WBC (ref 0–0.2)
PLATELET # BLD AUTO: 200 K/UL (ref 164–446)
PMV BLD AUTO: 11.7 FL (ref 9–12.9)
RBC # BLD AUTO: 5.51 M/UL (ref 4.2–5.4)
WBC # BLD AUTO: 9 K/UL (ref 4.8–10.8)

## 2025-05-21 PROCEDURE — 82306 VITAMIN D 25 HYDROXY: CPT

## 2025-05-21 PROCEDURE — 85025 COMPLETE CBC W/AUTO DIFF WBC: CPT

## 2025-05-21 PROCEDURE — 80053 COMPREHEN METABOLIC PANEL: CPT

## 2025-05-21 PROCEDURE — 80061 LIPID PANEL: CPT

## 2025-05-21 PROCEDURE — 84443 ASSAY THYROID STIM HORMONE: CPT

## 2025-05-21 PROCEDURE — 83036 HEMOGLOBIN GLYCOSYLATED A1C: CPT | Mod: GA

## 2025-05-21 PROCEDURE — 36415 COLL VENOUS BLD VENIPUNCTURE: CPT

## 2025-05-22 LAB
25(OH)D3 SERPL-MCNC: 69 NG/ML (ref 30–100)
ALBUMIN SERPL BCP-MCNC: 4.4 G/DL (ref 3.2–4.9)
ALBUMIN/GLOB SERPL: 1.6 G/DL
ALP SERPL-CCNC: 95 U/L (ref 30–99)
ALT SERPL-CCNC: 25 U/L (ref 2–50)
ANION GAP SERPL CALC-SCNC: 13 MMOL/L (ref 7–16)
AST SERPL-CCNC: 30 U/L (ref 12–45)
BILIRUB SERPL-MCNC: 1.3 MG/DL (ref 0.1–1.5)
BUN SERPL-MCNC: 13 MG/DL (ref 8–22)
CALCIUM ALBUM COR SERPL-MCNC: 9.5 MG/DL (ref 8.5–10.5)
CALCIUM SERPL-MCNC: 9.8 MG/DL (ref 8.5–10.5)
CHLORIDE SERPL-SCNC: 103 MMOL/L (ref 96–112)
CHOLEST SERPL-MCNC: 204 MG/DL (ref 100–199)
CO2 SERPL-SCNC: 24 MMOL/L (ref 20–33)
CREAT SERPL-MCNC: 0.77 MG/DL (ref 0.5–1.4)
EST. AVERAGE GLUCOSE BLD GHB EST-MCNC: 114 MG/DL
GFR SERPLBLD CREATININE-BSD FMLA CKD-EPI: 79 ML/MIN/1.73 M 2
GLOBULIN SER CALC-MCNC: 2.8 G/DL (ref 1.9–3.5)
GLUCOSE SERPL-MCNC: 127 MG/DL (ref 65–99)
HBA1C MFR BLD: 5.6 % (ref 4–5.6)
HDLC SERPL-MCNC: 88 MG/DL
LDLC SERPL CALC-MCNC: 98 MG/DL
POTASSIUM SERPL-SCNC: 4.6 MMOL/L (ref 3.6–5.5)
PROT SERPL-MCNC: 7.2 G/DL (ref 6–8.2)
SODIUM SERPL-SCNC: 140 MMOL/L (ref 135–145)
TRIGL SERPL-MCNC: 88 MG/DL (ref 0–149)
TSH SERPL-ACNC: 3.87 UIU/ML (ref 0.38–5.33)

## 2025-05-29 ENCOUNTER — APPOINTMENT (OUTPATIENT)
Dept: MEDICAL GROUP | Facility: PHYSICIAN GROUP | Age: 80
End: 2025-05-29
Payer: MEDICARE

## 2025-05-29 VITALS
TEMPERATURE: 97.3 F | HEART RATE: 71 BPM | RESPIRATION RATE: 18 BRPM | BODY MASS INDEX: 36.15 KG/M2 | DIASTOLIC BLOOD PRESSURE: 74 MMHG | HEIGHT: 65 IN | WEIGHT: 217 LBS | OXYGEN SATURATION: 97 % | SYSTOLIC BLOOD PRESSURE: 136 MMHG

## 2025-05-29 DIAGNOSIS — I10 BENIGN ESSENTIAL HTN: ICD-10-CM

## 2025-05-29 DIAGNOSIS — E78.2 MIXED HYPERLIPIDEMIA: ICD-10-CM

## 2025-05-29 DIAGNOSIS — Z12.11 COLON CANCER SCREENING: ICD-10-CM

## 2025-05-29 DIAGNOSIS — R73.01 IMPAIRED FASTING GLUCOSE: ICD-10-CM

## 2025-05-29 DIAGNOSIS — Z78.0 MENOPAUSE: Primary | ICD-10-CM

## 2025-05-29 ASSESSMENT — PATIENT HEALTH QUESTIONNAIRE - PHQ9: CLINICAL INTERPRETATION OF PHQ2 SCORE: 0

## 2025-05-29 ASSESSMENT — FIBROSIS 4 INDEX: FIB4 SCORE: 2.34

## 2025-05-30 NOTE — PROGRESS NOTES
"Subjective:   Rosa Riley is a 80 y.o. female here today for evaluation and management of:     Impaired fasting glucose  A1c excellent 5.6  Continue healthy diet, regular activity as tolerated    Benign essential HTN  Well controlled on lisinopril 30 mg with no cough.   She has no chest pain, LE edema  Walks her two dogs about a mile or more a day.     Mixed hyperlipidemia  On atorvastatin 20 mg   Tolerating it well no myalgia, normal lfts  Lab Results   Component Value Date/Time    CHOLSTRLTOT 204 (H) 05/21/2025 09:50 AM    LDL 98 05/21/2025 09:50 AM    HDL 88 05/21/2025 09:50 AM    TRIGLYCERIDE 88 05/21/2025 09:50 AM       Lab Results   Component Value Date/Time    SODIUM 140 05/21/2025 09:50 AM    POTASSIUM 4.6 05/21/2025 09:50 AM    CHLORIDE 103 05/21/2025 09:50 AM    CO2 24 05/21/2025 09:50 AM    GLUCOSE 127 (H) 05/21/2025 09:50 AM    BUN 13 05/21/2025 09:50 AM    CREATININE 0.77 05/21/2025 09:50 AM     Lab Results   Component Value Date/Time    ALKPHOSPHAT 95 05/21/2025 09:50 AM    ASTSGOT 30 05/21/2025 09:50 AM    ALTSGPT 25 05/21/2025 09:50 AM    TBILIRUBIN 1.3 05/21/2025 09:50 AM               Current medicines (including changes today)  Current Medications[1]  She  has a past medical history of Hyperlipidemia.    ROS  No chest pain, no shortness of breath, no abdominal pain       Objective:     /74 (BP Location: Left arm, Patient Position: Sitting, BP Cuff Size: Adult long)   Pulse 71   Temp 36.3 °C (97.3 °F) (Temporal)   Resp 18   Ht 1.651 m (5' 5\")   Wt 98.4 kg (217 lb)   SpO2 97%  Body mass index is 36.11 kg/m².   Physical Exam:  Constitutional: Alert, no distress.  Skin: Warm, dry, good turgor, no rashes in visible areas.  Eye: Equal, round and reactive, conjunctiva clear, lids normal.  ENMT: Lips without lesions, good dentition, oropharynx clear.  Neck: Trachea midline, no masses, no thyromegaly. No cervical or supraclavicular lymphadenopathy  Respiratory: Unlabored respiratory effort, " lungs clear to auscultation, no wheezes, no ronchi.  Cardiovascular: Normal S1, S2, no murmur, no edema.  Abdomen: Soft, non-tender, no masses, no hepatosplenomegaly.  Psych: Alert and oriented x3, normal affect and mood.    Assessment and Plan:   The following treatment plan was discussed    1. Menopause  - DS-BONE DENSITY STUDY (DEXA); Future    2. Colon cancer screening  - Cologuard® colon cancer screening    3. Impaired fasting glucose    4. Benign essential HTN    5. Mixed hyperlipidemia      Followup: Return in about 6 months (around 11/29/2025) for AWV.                [1]   Current Outpatient Medications   Medication Sig Dispense Refill    atorvastatin (LIPITOR) 20 MG Tab TAKE ONE TABLET BY MOUTH EVERY EVENING 90 Tablet 3    lisinopril (PRINIVIL) 30 MG tablet Take 1 Tablet by mouth every day. 90 Tablet 3    therapeutic multivitamin-minerals (THERAGRAN-M) Tab Take 1 Tab by mouth every day.      Cholecalciferol 4000 units Cap Take  by mouth.      Ascorbic Acid 500 MG Cap Take  by mouth.      CALCIUM PO Take  by mouth.       No current facility-administered medications for this visit.

## 2025-05-30 NOTE — ASSESSMENT & PLAN NOTE
On atorvastatin 20 mg   Tolerating it well no myalgia, normal lfts  Lab Results   Component Value Date/Time    CHOLSTRLTOT 204 (H) 05/21/2025 09:50 AM    LDL 98 05/21/2025 09:50 AM    HDL 88 05/21/2025 09:50 AM    TRIGLYCERIDE 88 05/21/2025 09:50 AM       Lab Results   Component Value Date/Time    SODIUM 140 05/21/2025 09:50 AM    POTASSIUM 4.6 05/21/2025 09:50 AM    CHLORIDE 103 05/21/2025 09:50 AM    CO2 24 05/21/2025 09:50 AM    GLUCOSE 127 (H) 05/21/2025 09:50 AM    BUN 13 05/21/2025 09:50 AM    CREATININE 0.77 05/21/2025 09:50 AM     Lab Results   Component Value Date/Time    ALKPHOSPHAT 95 05/21/2025 09:50 AM    ASTSGOT 30 05/21/2025 09:50 AM    ALTSGPT 25 05/21/2025 09:50 AM    TBILIRUBIN 1.3 05/21/2025 09:50 AM

## 2025-06-03 NOTE — TELEPHONE ENCOUNTER
Received request via: Patient    Was the patient seen in the last year in this department? Yes    Does the patient have an active prescription (recently filled or refills available) for medication(s) requested? No    Ph[facility] Name: [facility]    Does the patient have custodial Plus and need 100-day supply? (This applies to ALL medications) Patient does not have SCP

## 2025-06-05 RX ORDER — LISINOPRIL 30 MG/1
30 TABLET ORAL DAILY
Qty: 90 TABLET | Refills: 3 | Status: SHIPPED | OUTPATIENT
Start: 2025-06-05

## 2025-06-24 LAB — NONINV COLON CA DNA+OCC BLD SCRN STL QL: POSITIVE

## 2025-07-02 RX ORDER — ATORVASTATIN CALCIUM 20 MG/1
20 TABLET, FILM COATED ORAL EVERY EVENING
Qty: 90 TABLET | Refills: 0 | Status: SHIPPED | OUTPATIENT
Start: 2025-07-02

## 2025-07-02 NOTE — TELEPHONE ENCOUNTER
Received request via: Patient    Was the patient seen in the last year in this department? Yes    Does the patient have an active prescription (recently filled or refills available) for medication(s) requested? No    [facility]     Does the patient have assisted Plus and need 100-day supply? (This applies to ALL medications) Patient does not have SCP

## 2025-07-03 ENCOUNTER — APPOINTMENT (OUTPATIENT)
Dept: RADIOLOGY | Facility: MEDICAL CENTER | Age: 80
End: 2025-07-03
Attending: FAMILY MEDICINE
Payer: MEDICARE

## 2025-07-03 DIAGNOSIS — Z78.0 MENOPAUSE: ICD-10-CM

## 2025-07-03 PROCEDURE — 77080 DXA BONE DENSITY AXIAL: CPT

## 2025-07-22 DIAGNOSIS — R19.5 POSITIVE COLORECTAL CANCER SCREENING USING COLOGUARD TEST: Primary | ICD-10-CM

## 2025-07-27 NOTE — Clinical Note
REFERRAL APPROVAL NOTICE         Sent on July 26, 2025                   Rosa Riley  [address] [place] [address]                   Dear Ms. Riley,    After a careful review of the medical information and benefit coverage, [facility] has processed your referral. See below for additional details.    If applicable, you must be actively enrolled with your insurance for coverage of the authorized service. If you have any questions regarding your coverage, please contact your insurance directly.    REFERRAL INFORMATION   Referral #:  [id number]  Referred-To Provider    Referred-By Provider:  Gastroenterology    Quintin Jaquez M.D.   [facility]      [address] [place] [id number] [phone] [address] [place] [id number] [phone]    Referral Start Date:  07/22/2025  Referral End Date:   07/22/2026             SCHEDULING  If you do not already have an appointment, please call [phone] to make an appointment.     MORE INFORMATION  If you do not already have a "[email or web address]" account, sign up at: [email or web address]  You can access your medical information, make appointments, see lab results, billing information, and more.  If you have questions regarding this referral, please contact  the [facility] Referrals department at:             [phone]. Monday - Friday 8:00AM - 5:00PM.     Sincerely,    [facility]